# Patient Record
Sex: MALE | Race: WHITE | NOT HISPANIC OR LATINO | URBAN - METROPOLITAN AREA
[De-identification: names, ages, dates, MRNs, and addresses within clinical notes are randomized per-mention and may not be internally consistent; named-entity substitution may affect disease eponyms.]

---

## 2024-07-12 ENCOUNTER — PREP FOR PROCEDURE (OUTPATIENT)
Dept: UROLOGY | Facility: CLINIC | Age: 54
End: 2024-07-12

## 2024-07-19 NOTE — PROGRESS NOTES
HPI  PT PRESENTS FOR A F/U ON HIS H/O BLADDER CANCER.     PT HAS BEEN ON INTRAVESICAL MITOMYCIN THERAPY -- LAST TREATMENT WAS END OF MAY.  OVERALL HE IS DOING WELL.  HE IS DUE FOR A SURVEILLANCE CYSTO.   Are you experiencing:  Burning on urination --  NO  Urinary frequency  --NBO  Urinary urgency --NO  Urge incontinence -- NO  Nocturia-- ONCE  Hematuria -- NO  Hesitancy -- NO  Post void fullness --   NO    Review of Systems  General-- No C/O fever or chills  Head-- No C/O Dizziness  Eyes-- NO  C/O blurry or double vision  Ears-- No C/O hearing loss  Neck-- Supple -- OCC NECK PAIN  Chest-- No C/O pain or discomfort  Lungs-- No C/O shortness of breath  Abdomen-- No C/O  pain or discomfort, No nausea or vomiting  Back-- No C/O back pain or discomfort  Extremities-- No C/O swelling or pain      Objective   Physical Exam    General-- well developed, well nourished in NAD  Head-- normal cephalic, atraumatic  Eyes-- PERRL, EOM'S FROM,  no  jaundice  Neck-- Supple, without masses  Chest-- Normal bony structure  Abdomen-- soft, non tender, liver spleen not tender. No supra pubic masses, OBESE, RECTUS DIASTASES   Back-- no flank masses palpable, no CVA tenderness on palpation or perc;ussion  Lymph nodes-- No inguinal lymphadenopathy noted  Prostate-- 1+, firm, smooth, non tender,without nodules  Testis-- both down, non tender, without masses  Epididymis-- no masses palpable  Scrotum -- no hydrocele noted  Extremities -- Normal muscle mass and tone for the patients age  Neurological-- oriented times three    2-23-24  CAT SCAN OF THE ABD AND PELVIS:  Impression  Mild dilation of the distal right ureter with compared to the left is nonspecific and may be due to peristalsis however lesion at the right ureterovesicular junction is not excluded.    Wall thickening of the urinary bladder may be due to incomplete distention or cystitis.    No urinary tract calculi identified.    Assessment/Plan   H/O BLADDER CANCER-- PT IS ON  INTRAVESICAL MITOMYCIN THERA;Y-- PT HAS HAD INDUCTION AND TWO MONTHLY TREATMENTS SINCE HIS LAST CYSTO AND BLADDER BX   PT S/P INTRAVESICAL BCG THERAPY  PRIOR TO THE MITOMYCIN  P:  SCHEDULE:  CYSTO, OUT PT,GEN ANES  OBTAIN ALL RECORDS FROM [facility]     Ceferino Lynch MD

## 2024-07-22 ENCOUNTER — LAB (OUTPATIENT)
Dept: LAB | Facility: LAB | Age: 54
End: 2024-07-22
Payer: COMMERCIAL

## 2024-07-22 ENCOUNTER — OFFICE VISIT (OUTPATIENT)
Dept: UROLOGY | Facility: CLINIC | Age: 54
End: 2024-07-22
Payer: COMMERCIAL

## 2024-07-22 VITALS
DIASTOLIC BLOOD PRESSURE: 82 MMHG | HEART RATE: 98 BPM | BODY MASS INDEX: 35.87 KG/M2 | TEMPERATURE: 98 F | SYSTOLIC BLOOD PRESSURE: 163 MMHG | WEIGHT: 250 LBS

## 2024-07-22 DIAGNOSIS — C67.9 MALIGNANT NEOPLASM OF URINARY BLADDER, UNSPECIFIED SITE (MULTI): ICD-10-CM

## 2024-07-22 LAB
ALBUMIN SERPL BCP-MCNC: 4.3 G/DL (ref 3.4–5)
ALP SERPL-CCNC: 27 U/L (ref 33–120)
ALT SERPL W P-5'-P-CCNC: 27 U/L (ref 10–52)
ANION GAP SERPL CALC-SCNC: 10 MMOL/L (ref 10–20)
AST SERPL W P-5'-P-CCNC: 18 U/L (ref 9–39)
BILIRUB SERPL-MCNC: 0.6 MG/DL (ref 0–1.2)
BUN SERPL-MCNC: 13 MG/DL (ref 6–23)
CALCIUM SERPL-MCNC: 9 MG/DL (ref 8.6–10.3)
CHLORIDE SERPL-SCNC: 106 MMOL/L (ref 98–107)
CO2 SERPL-SCNC: 27 MMOL/L (ref 21–32)
CREAT SERPL-MCNC: 0.91 MG/DL (ref 0.5–1.3)
EGFRCR SERPLBLD CKD-EPI 2021: >90 ML/MIN/1.73M*2
ERYTHROCYTE [DISTWIDTH] IN BLOOD BY AUTOMATED COUNT: 12.2 % (ref 11.5–14.5)
GLUCOSE SERPL-MCNC: 106 MG/DL (ref 74–99)
HCT VFR BLD AUTO: 40.7 % (ref 41–52)
HGB BLD-MCNC: 14.4 G/DL (ref 13.5–17.5)
MCH RBC QN AUTO: 32.7 PG (ref 26–34)
MCHC RBC AUTO-ENTMCNC: 35.4 G/DL (ref 32–36)
MCV RBC AUTO: 92 FL (ref 80–100)
NRBC BLD-RTO: 0 /100 WBCS (ref 0–0)
PLATELET # BLD AUTO: 204 X10*3/UL (ref 150–450)
POTASSIUM SERPL-SCNC: 4.3 MMOL/L (ref 3.5–5.3)
PROT SERPL-MCNC: 6.3 G/DL (ref 6.4–8.2)
RBC # BLD AUTO: 4.41 X10*6/UL (ref 4.5–5.9)
SODIUM SERPL-SCNC: 139 MMOL/L (ref 136–145)
WBC # BLD AUTO: 6.8 X10*3/UL (ref 4.4–11.3)

## 2024-07-22 PROCEDURE — 36415 COLL VENOUS BLD VENIPUNCTURE: CPT

## 2024-07-22 PROCEDURE — 87086 URINE CULTURE/COLONY COUNT: CPT | Mod: PARLAB | Performed by: UROLOGY

## 2024-07-22 PROCEDURE — 99214 OFFICE O/P EST MOD 30 MIN: CPT | Performed by: UROLOGY

## 2024-07-22 PROCEDURE — 80053 COMPREHEN METABOLIC PANEL: CPT

## 2024-07-22 PROCEDURE — 85027 COMPLETE CBC AUTOMATED: CPT

## 2024-07-22 RX ORDER — NAPROXEN 500 MG/1
500 TABLET ORAL DAILY PRN
COMMUNITY

## 2024-07-22 RX ORDER — CRANBERRY FRUIT EXTRACT 650 MG
2 CAPSULE ORAL DAILY
COMMUNITY

## 2024-07-23 ENCOUNTER — PREP FOR PROCEDURE (OUTPATIENT)
Dept: UROLOGY | Facility: CLINIC | Age: 54
End: 2024-07-23
Payer: COMMERCIAL

## 2024-07-23 DIAGNOSIS — C67.2 MALIGNANT NEOPLASM OF LATERAL WALL OF URINARY BLADDER (MULTI): Primary | ICD-10-CM

## 2024-07-23 LAB — BACTERIA UR CULT: NORMAL

## 2024-07-23 RX ORDER — CEFAZOLIN SODIUM 2 G/100ML
2 INJECTION, SOLUTION INTRAVENOUS ONCE
OUTPATIENT
Start: 2024-07-23 | End: 2024-07-23

## 2024-07-23 RX ORDER — DEXTROSE MONOHYDRATE AND SODIUM CHLORIDE 5; .45 G/100ML; G/100ML
100 INJECTION, SOLUTION INTRAVENOUS CONTINUOUS
Status: CANCELLED | OUTPATIENT
Start: 2024-07-23

## 2024-07-26 ENCOUNTER — PRE-ADMISSION TESTING (OUTPATIENT)
Dept: PREADMISSION TESTING | Facility: HOSPITAL | Age: 54
End: 2024-07-26
Payer: COMMERCIAL

## 2024-07-26 VITALS
RESPIRATION RATE: 20 BRPM | HEIGHT: 70 IN | TEMPERATURE: 96.8 F | WEIGHT: 248.46 LBS | DIASTOLIC BLOOD PRESSURE: 105 MMHG | HEART RATE: 61 BPM | BODY MASS INDEX: 35.57 KG/M2 | OXYGEN SATURATION: 99 % | SYSTOLIC BLOOD PRESSURE: 188 MMHG

## 2024-07-26 DIAGNOSIS — Z01.818 PREOP TESTING: Primary | ICD-10-CM

## 2024-07-26 PROCEDURE — 93005 ELECTROCARDIOGRAM TRACING: CPT

## 2024-07-26 PROCEDURE — 99204 OFFICE O/P NEW MOD 45 MIN: CPT

## 2024-07-26 PROCEDURE — 93010 ELECTROCARDIOGRAM REPORT: CPT | Performed by: STUDENT IN AN ORGANIZED HEALTH CARE EDUCATION/TRAINING PROGRAM

## 2024-07-26 RX ORDER — ACETAMINOPHEN 325 MG/1
TABLET ORAL EVERY 6 HOURS PRN
COMMUNITY

## 2024-07-26 ASSESSMENT — PAIN - FUNCTIONAL ASSESSMENT: PAIN_FUNCTIONAL_ASSESSMENT: 0-10

## 2024-07-26 ASSESSMENT — DUKE ACTIVITY SCORE INDEX (DASI)
CAN YOU PARTICIPATE IN STRENOUS SPORTS LIKE SWIMMING, SINGLES TENNIS, FOOTBALL, BASKETBALL, OR SKIING: NO
CAN YOU RUN A SHORT DISTANCE: NO
DASI METS SCORE: 8
CAN YOU TAKE CARE OF YOURSELF (EAT, DRESS, BATHE, OR USE TOILET): YES
CAN YOU PARTICIPATE IN MODERATE RECREATIONAL ACTIVITIES LIKE GOLF, BOWLING, DANCING, DOUBLES TENNIS OR THROWING A BASEBALL OR FOOTBALL: YES
CAN YOU DO YARD WORK LIKE RAKING LEAVES, WEEDING OR PUSHING A MOWER: YES
CAN YOU DO LIGHT WORK AROUND THE HOUSE LIKE DUSTING OR WASHING DISHES: YES
CAN YOU HAVE SEXUAL RELATIONS: YES
CAN YOU DO MODERATE WORK AROUND THE HOUSE LIKE VACUUMING, SWEEPING FLOORS OR CARRYING GROCERIES: YES
CAN YOU DO HEAVY WORK AROUND THE HOUSE LIKE SCRUBBING FLOORS OR LIFTING AND MOVING HEAVY FURNITURE: YES
CAN YOU WALK A BLOCK OR TWO ON LEVEL GROUND: YES
CAN YOU CLIMB A FLIGHT OF STAIRS OR WALK UP A HILL: YES
TOTAL_SCORE: 42.7
CAN YOU WALK INDOORS, SUCH AS AROUND YOUR HOUSE: YES

## 2024-07-26 ASSESSMENT — PAIN SCALES - GENERAL: PAINLEVEL_OUTOF10: 0 - NO PAIN

## 2024-07-26 NOTE — CPM/PAT H&P
CPM/PAT Evaluation       Name: Cornell Larios (Cornell Larios)  /Age: 1970/54 y.o.     Visit Type:   In-Person       Chief Complaint: bladder cancer    HPI  Patient is a 54 year old male with a history of former tobacco use and bladder cancer who presents today for preoperative evaluation for cystoscopy. He has no other documented significant history. Patient follows with  urology for his bladder cancer. He has been on intravesical mitomycin therapy and he is due for his surveillance cystoscopy. Patient is scheduled for this on 24 with Dr. Lynch. Patient denies recent illness, fever, chills, cough, shortness of breath, chest pain, palpitations, lower extremity edema, GI symptoms, or urinary symptoms such as hematuria, frequency or burning with urination. He does endorse chronic bilateral leg and low back pain that he takes Naproxen for daily.     Of note: patient's BP is elevated on exam today at 188/105. He has no documented history of hypertension. He had a cardiac stress test in 2023 which was normal. At that time Dr. Briggs placed him on a weeks worth of some BP med as he was slightly elevated for his procedure in . Patient states that he has not been on anything continuous for BP since. Usually when he checks his pressure at home he's in the 140s/90s.    History reviewed. No pertinent past medical history.    Past Surgical History:   Procedure Laterality Date    OTHER SURGICAL HISTORY      Bladder cancer removal       Patient  has no history on file for sexual activity.    No family history on file.    No Known Allergies    Prior to Admission medications    Medication Sig Start Date End Date Taking? Authorizing Provider   multivit-min-FA-lycopene-boron (Bladder 2.2) 200-5-250 mcg-mg-mcg tablet Take 2 tablets by mouth once daily.    Historical Provider, MD   naproxen (EC Naprosyn) 500 mg EC tablet Take 1 tablet (500 mg) by mouth once daily as needed for mild pain (1 - 3). Do not  crush, chew, or split.    Historical Provider, MD        A ten-point review of systems was completed and is otherwise negative except for what is mentioned in the HPI above.    Physical Exam:    GENERAL: Well developed, awake/alert/oriented x3, no distress, alert and cooperative  HEENT: MMM  NECK: Trachea midline, no lymphadenopathy  CARDIOVASCULAR: RRR, mildly bradycardic in the 50s, normal S1 and S 2, no murmurs, 2+ equal pulses of the extremities, no LE edema  RESPIRATORY: Patent airways, CTAB, normal breath sounds with good chest expansion, thorax symmetric  ABDOMEN: Soft, non-tender, no distention  SKIN: Warm and dry  EXTREMITIES: No cyanosis, edema  NEURO: A&O x 3, normal motor and sensation, no focal deficits   PSYCH: Appropriate mood and behavior      PAT AIRWAY:   Airway:     Mallampati::  I    TM distance::  >3 FB    Neck ROM::  Full  normal        Visit Vitals  BP (!) 180/100   Pulse 61   Temp 36 °C (96.8 °F) (Temporal)   Resp 20       DASI Risk Score      Flowsheet Row Most Recent Value   DASI SCORE 42.7   METS Score (Will be calculated only when all the questions are answered) 8          Caprini DVT Assessment    No data to display       Modified Frailty Index    No data to display       CHADS2 Stroke Risk  Current as of 11 minutes ago        N/A 3 to 100%: High Risk   2 to < 3%: Medium Risk   0 to < 2%: Low Risk     Last Change: N/A          This score determines the patient's risk of having a stroke if the patient has atrial fibrillation.        This score is not applicable to this patient. Components are not calculated.          Revised Cardiac Risk Index    No data to display       Apfel Simplified Score    No data to display       Risk Analysis Index Results This Encounter    No data found in the last 1 encounters.       Stop Bang Score      Flowsheet Row Most Recent Value   Do you snore loudly? 0   Do you often feel tired or fatigued after your sleep? 0   Has anyone ever observed you stop  breathing in your sleep? 0   Do you have or are you being treated for high blood pressure? 0   Recent BMI (Calculated) 35.7   Is BMI greater than 35 kg/m2? 1=Yes   Age older than 50 years old? 1=Yes   Is your neck circumference greater than 17 inches (Male) or 16 inches (Female)? 1   Gender - Male 1=Yes   STOP-BANG Total Score 4            Assessment and Plan:   Patient is a 54 year old male with a history of former tobacco use and bladder cancer He has been on intravesical mitomycin therapy.    #Bladder cancer  Scheduled for cystoscopy on 7/30/24 with Dr. Lynch.    #High blood pressure with undocumented HTN  Patient had remote history of HTN which he currently takes nothing for.   Had a normal stress test in June of 2023.   EKG obtained today demonstrates sinus bradycardia with no other abnormalities.  BP is R 189/100  -- L 186/101 > 188/105 on recheck. Patient is asymptomatic.  Message sent to anesthesia Dr. Covarrubias and Dr. Briggs in attempts to facilitate expedient treatment of hypertension prior to Tuesday's procedure. Unfortunately we were unable to reach Dr. Briggs at this time to see if he could treat the patient by Tuesday. Patient also states it will be difficult to get up here for an appointment with Dr. Briggs since he lives in [place]. Patient does not have a PCP currently so I advised him to seek medical treatment for his hypertension at an urgent care over the weekend in hopes that he can get his blood pressure under control prior to Tuesday. Patient was advised to monitor his BP at home over the weekend after getting on an antihypertensive and to call Dr. Lynch's office if he is unable to maintain a BP <170/90. In this case anesthesia would likely postpone procedure. Dr. Lynch was messaged and notified of today's findings and plan moving forward.     Patient was also strongly advised to find  PCP near him to follow up with his BP, elevated blood glucose, and daily use of Naproxen for leg pain.      Labs from 7/22/24 reviewed and generally unremarkable. Glucose is 106. Encouraged patient to find a new PCP to follow with regarding elevated blood glucose.   Urine culture shows no growth.     I spent 45 minutes in the professional and overall care of this patient. Greater than 50% of this time was spent counseling patient, reviewing plan of care.    Dorita Prajapati, APRN-CNP

## 2024-07-29 ENCOUNTER — APPOINTMENT (OUTPATIENT)
Dept: UROLOGY | Facility: CLINIC | Age: 54
End: 2024-07-29

## 2024-07-30 ENCOUNTER — ANESTHESIA EVENT (OUTPATIENT)
Dept: OPERATING ROOM | Facility: HOSPITAL | Age: 54
End: 2024-07-30
Payer: COMMERCIAL

## 2024-07-30 ENCOUNTER — ANESTHESIA (OUTPATIENT)
Dept: OPERATING ROOM | Facility: HOSPITAL | Age: 54
End: 2024-07-30
Payer: COMMERCIAL

## 2024-07-30 ENCOUNTER — HOSPITAL ENCOUNTER (OUTPATIENT)
Facility: HOSPITAL | Age: 54
Setting detail: OUTPATIENT SURGERY
Discharge: HOME | End: 2024-07-30
Attending: UROLOGY | Admitting: UROLOGY
Payer: COMMERCIAL

## 2024-07-30 VITALS
HEART RATE: 51 BPM | SYSTOLIC BLOOD PRESSURE: 135 MMHG | DIASTOLIC BLOOD PRESSURE: 80 MMHG | OXYGEN SATURATION: 95 % | RESPIRATION RATE: 16 BRPM | TEMPERATURE: 96.8 F

## 2024-07-30 DIAGNOSIS — C67.2 MALIGNANT NEOPLASM OF LATERAL WALL OF URINARY BLADDER (MULTI): ICD-10-CM

## 2024-07-30 PROCEDURE — 2500000004 HC RX 250 GENERAL PHARMACY W/ HCPCS (ALT 636 FOR OP/ED)

## 2024-07-30 PROCEDURE — 3600000008 HC OR TIME - EACH INCREMENTAL 1 MINUTE - PROCEDURE LEVEL THREE: Performed by: UROLOGY

## 2024-07-30 PROCEDURE — 7100000009 HC PHASE TWO TIME - INITIAL BASE CHARGE: Performed by: UROLOGY

## 2024-07-30 PROCEDURE — 2500000005 HC RX 250 GENERAL PHARMACY W/O HCPCS: Performed by: UROLOGY

## 2024-07-30 PROCEDURE — 7100000002 HC RECOVERY ROOM TIME - EACH INCREMENTAL 1 MINUTE: Performed by: UROLOGY

## 2024-07-30 PROCEDURE — 2500000005 HC RX 250 GENERAL PHARMACY W/O HCPCS

## 2024-07-30 PROCEDURE — 3700000002 HC GENERAL ANESTHESIA TIME - EACH INCREMENTAL 1 MINUTE: Performed by: UROLOGY

## 2024-07-30 PROCEDURE — A52000 PR CYSTOURETHROSCOPY: Performed by: NURSE ANESTHETIST, CERTIFIED REGISTERED

## 2024-07-30 PROCEDURE — 7100000010 HC PHASE TWO TIME - EACH INCREMENTAL 1 MINUTE: Performed by: UROLOGY

## 2024-07-30 PROCEDURE — 2500000004 HC RX 250 GENERAL PHARMACY W/ HCPCS (ALT 636 FOR OP/ED): Mod: JZ | Performed by: UROLOGY

## 2024-07-30 PROCEDURE — 88112 CYTOPATH CELL ENHANCE TECH: CPT | Performed by: PATHOLOGY

## 2024-07-30 PROCEDURE — 7100000001 HC RECOVERY ROOM TIME - INITIAL BASE CHARGE: Performed by: UROLOGY

## 2024-07-30 PROCEDURE — 88112 CYTOPATH CELL ENHANCE TECH: CPT | Mod: TC | Performed by: UROLOGY

## 2024-07-30 PROCEDURE — 3700000001 HC GENERAL ANESTHESIA TIME - INITIAL BASE CHARGE: Performed by: UROLOGY

## 2024-07-30 PROCEDURE — A52000 PR CYSTOURETHROSCOPY: Performed by: ANESTHESIOLOGY

## 2024-07-30 PROCEDURE — 3600000003 HC OR TIME - INITIAL BASE CHARGE - PROCEDURE LEVEL THREE: Performed by: UROLOGY

## 2024-07-30 RX ORDER — ACETAMINOPHEN 325 MG/1
975 TABLET ORAL ONCE
Status: DISCONTINUED | OUTPATIENT
Start: 2024-07-30 | End: 2024-07-30 | Stop reason: HOSPADM

## 2024-07-30 RX ORDER — CEFAZOLIN SODIUM 2 G/100ML
2 INJECTION, SOLUTION INTRAVENOUS ONCE
Status: COMPLETED | OUTPATIENT
Start: 2024-07-30 | End: 2024-07-30

## 2024-07-30 RX ORDER — SODIUM CHLORIDE, SODIUM LACTATE, POTASSIUM CHLORIDE, CALCIUM CHLORIDE 600; 310; 30; 20 MG/100ML; MG/100ML; MG/100ML; MG/100ML
100 INJECTION, SOLUTION INTRAVENOUS CONTINUOUS
Status: DISCONTINUED | OUTPATIENT
Start: 2024-07-30 | End: 2024-07-30 | Stop reason: HOSPADM

## 2024-07-30 RX ORDER — MEPERIDINE HYDROCHLORIDE 25 MG/ML
12.5 INJECTION INTRAMUSCULAR; INTRAVENOUS; SUBCUTANEOUS EVERY 10 MIN PRN
Status: DISCONTINUED | OUTPATIENT
Start: 2024-07-30 | End: 2024-07-30 | Stop reason: HOSPADM

## 2024-07-30 RX ORDER — LABETALOL HYDROCHLORIDE 5 MG/ML
10 INJECTION, SOLUTION INTRAVENOUS ONCE AS NEEDED
Status: DISCONTINUED | OUTPATIENT
Start: 2024-07-30 | End: 2024-07-30 | Stop reason: HOSPADM

## 2024-07-30 RX ORDER — LOSARTAN POTASSIUM 50 MG/1
100 TABLET ORAL DAILY
COMMUNITY

## 2024-07-30 RX ORDER — LIDOCAINE HCL/PF 100 MG/5ML
SYRINGE (ML) INTRAVENOUS AS NEEDED
Status: DISCONTINUED | OUTPATIENT
Start: 2024-07-30 | End: 2024-07-30

## 2024-07-30 RX ORDER — ALBUTEROL SULFATE 0.83 MG/ML
2.5 SOLUTION RESPIRATORY (INHALATION) ONCE AS NEEDED
Status: DISCONTINUED | OUTPATIENT
Start: 2024-07-30 | End: 2024-07-30 | Stop reason: HOSPADM

## 2024-07-30 RX ORDER — ONDANSETRON HYDROCHLORIDE 2 MG/ML
INJECTION, SOLUTION INTRAVENOUS AS NEEDED
Status: DISCONTINUED | OUTPATIENT
Start: 2024-07-30 | End: 2024-07-30

## 2024-07-30 RX ORDER — HYDRALAZINE HYDROCHLORIDE 20 MG/ML
10 INJECTION INTRAMUSCULAR; INTRAVENOUS EVERY 30 MIN PRN
Status: DISCONTINUED | OUTPATIENT
Start: 2024-07-30 | End: 2024-07-30 | Stop reason: HOSPADM

## 2024-07-30 RX ORDER — HYDROMORPHONE HYDROCHLORIDE 1 MG/ML
1 INJECTION, SOLUTION INTRAMUSCULAR; INTRAVENOUS; SUBCUTANEOUS EVERY 5 MIN PRN
Status: DISCONTINUED | OUTPATIENT
Start: 2024-07-30 | End: 2024-07-30 | Stop reason: HOSPADM

## 2024-07-30 RX ORDER — SCOLOPAMINE TRANSDERMAL SYSTEM 1 MG/1
1 PATCH, EXTENDED RELEASE TRANSDERMAL ONCE
Status: DISCONTINUED | OUTPATIENT
Start: 2024-07-30 | End: 2024-07-30 | Stop reason: HOSPADM

## 2024-07-30 RX ORDER — MIDAZOLAM HYDROCHLORIDE 1 MG/ML
INJECTION, SOLUTION INTRAMUSCULAR; INTRAVENOUS AS NEEDED
Status: DISCONTINUED | OUTPATIENT
Start: 2024-07-30 | End: 2024-07-30

## 2024-07-30 RX ORDER — FAMOTIDINE 10 MG/ML
20 INJECTION INTRAVENOUS ONCE
Status: DISCONTINUED | OUTPATIENT
Start: 2024-07-30 | End: 2024-07-30 | Stop reason: HOSPADM

## 2024-07-30 RX ORDER — MORPHINE SULFATE 2 MG/ML
2 INJECTION, SOLUTION INTRAMUSCULAR; INTRAVENOUS EVERY 5 MIN PRN
Status: DISCONTINUED | OUTPATIENT
Start: 2024-07-30 | End: 2024-07-30 | Stop reason: HOSPADM

## 2024-07-30 RX ORDER — SODIUM CHLORIDE, SODIUM LACTATE, POTASSIUM CHLORIDE, CALCIUM CHLORIDE 600; 310; 30; 20 MG/100ML; MG/100ML; MG/100ML; MG/100ML
INJECTION, SOLUTION INTRAVENOUS CONTINUOUS PRN
Status: DISCONTINUED | OUTPATIENT
Start: 2024-07-30 | End: 2024-07-30

## 2024-07-30 RX ORDER — FENTANYL CITRATE 50 UG/ML
INJECTION, SOLUTION INTRAMUSCULAR; INTRAVENOUS AS NEEDED
Status: DISCONTINUED | OUTPATIENT
Start: 2024-07-30 | End: 2024-07-30

## 2024-07-30 RX ORDER — PROPOFOL 10 MG/ML
INJECTION, EMULSION INTRAVENOUS AS NEEDED
Status: DISCONTINUED | OUTPATIENT
Start: 2024-07-30 | End: 2024-07-30

## 2024-07-30 RX ORDER — ONDANSETRON HYDROCHLORIDE 2 MG/ML
4 INJECTION, SOLUTION INTRAVENOUS ONCE AS NEEDED
Status: DISCONTINUED | OUTPATIENT
Start: 2024-07-30 | End: 2024-07-30 | Stop reason: HOSPADM

## 2024-07-30 RX ORDER — SODIUM CHLORIDE 0.9 G/100ML
IRRIGANT IRRIGATION AS NEEDED
Status: DISCONTINUED | OUTPATIENT
Start: 2024-07-30 | End: 2024-07-30 | Stop reason: HOSPADM

## 2024-07-30 RX ORDER — MIDAZOLAM HYDROCHLORIDE 1 MG/ML
1 INJECTION, SOLUTION INTRAMUSCULAR; INTRAVENOUS ONCE AS NEEDED
Status: DISCONTINUED | OUTPATIENT
Start: 2024-07-30 | End: 2024-07-30 | Stop reason: HOSPADM

## 2024-07-30 RX ORDER — DIPHENHYDRAMINE HYDROCHLORIDE 50 MG/ML
25 INJECTION INTRAMUSCULAR; INTRAVENOUS ONCE AS NEEDED
Status: DISCONTINUED | OUTPATIENT
Start: 2024-07-30 | End: 2024-07-30 | Stop reason: HOSPADM

## 2024-07-30 RX ORDER — METOPROLOL TARTRATE 1 MG/ML
5 INJECTION, SOLUTION INTRAVENOUS ONCE
Status: DISCONTINUED | OUTPATIENT
Start: 2024-07-30 | End: 2024-07-30 | Stop reason: HOSPADM

## 2024-07-30 SDOH — HEALTH STABILITY: MENTAL HEALTH: CURRENT SMOKER: 0

## 2024-07-30 ASSESSMENT — PAIN SCALES - GENERAL
PAINLEVEL_OUTOF10: 0 - NO PAIN

## 2024-07-30 ASSESSMENT — COLUMBIA-SUICIDE SEVERITY RATING SCALE - C-SSRS
1. IN THE PAST MONTH, HAVE YOU WISHED YOU WERE DEAD OR WISHED YOU COULD GO TO SLEEP AND NOT WAKE UP?: NO
6. HAVE YOU EVER DONE ANYTHING, STARTED TO DO ANYTHING, OR PREPARED TO DO ANYTHING TO END YOUR LIFE?: NO
2. HAVE YOU ACTUALLY HAD ANY THOUGHTS OF KILLING YOURSELF?: NO

## 2024-07-30 ASSESSMENT — PAIN - FUNCTIONAL ASSESSMENT
PAIN_FUNCTIONAL_ASSESSMENT: 0-10
PAIN_FUNCTIONAL_ASSESSMENT: 0-10

## 2024-07-30 NOTE — OP NOTE
CYSTOSCOPY/URINE TESTING Operative Note     Date: 2024  OR Location: [facility]    Name: Cornell Larios, : 1970, Age: 54 y.o., MRN: [id number], Sex: male    Diagnosis  Pre-op Diagnosis      * Malignant neoplasm of lateral wall of urinary bladder (Multi) [C67.2] Post-op Diagnosis     * Malignant neoplasm of lateral wall of urinary bladder (Multi) [C67.2]     Procedures  CYSTOSCOPY/URINE TESTING  64077 - AR CYSTOURETHROSCOPY    CYSTOSCOPY/URINE TESTING  55707 - AR CYSTOURETHROSCOPY      Surgeons      * Ceferino Lynch - Primary    Resident/Fellow/Other Assistant:  Surgeons and Role:  * No surgeons found with a matching role *    Procedure Summary  This is a 54-year-old male who has been seen by me in the past because of a history of bladder cancer.  The patient has been on intravesical mitomycin in the past and now comes into the operating room to undergo a surveillance cystoscopy.  Operative note the patient was brought into the operating room placed on the operating table in the supine position and after adequate general anesthesia was induced was placed in dorsal lithotomy position.  After sterilely prepping and draping the patient's perineum and genital area and #22 [place] Storz cystoscope was introduced into the urethra and under direct vision passed into the bladder.  Urine from the bladder was obtained and sent to pathology for a FISH test.  The bladder was then carefully inspected and no tumors, stones, foreign bodies or diverticuli were identified.  The ureteral orifice ease were in the normal anatomic position and refluxing clear urine bilaterally.  There was mild to moderate prostatic hypertrophy present.  A previous biopsy site on the left posterior wall of the bladder had healed completely and there was no evidence of residual tumor present.  Assessment normal cystoscopy.  Plan the patient will be seen by me in approximately 3 months for a surveillance cystoscopy.  Anesthesia: General  ASA:  III  Anesthesia Staff: Anesthesiologist: Donnie Covarrubias MD  CRNA: IRA Aldridge, RIC  SRNA: Xiomara Shane  Estimated Blood Loss: 0mL  Intra-op Medications: Administrations occurring from 1305 to 1405 on 07/30/24:  * No intraprocedure medications in log *           Anesthesia Record               Intraprocedure I/O Totals       None           Specimen:   ID Type Source Tests Collected by Time   1 : URINE FOR CYTOLOGY-SEE COMMENT-FROM CYTOSCOPY IN OR Non-Gynecologic Cytology URINE CLEAN CATCH CYTOLOGY CONSULTATION (NON-GYNECOLOGIC) Ceferino Lynch MD 7/30/2024 1303        Staff:   Circulator: Dee Marte Person: Marcia         Drains and/or Catheters: * None in log *    Tourniquet Times:         Implants:     Findings: see op note    Indications: Cornell Larios is an 54 y.o. male who is having surgery for Malignant neoplasm of lateral wall of urinary bladder (Multi) [C67.2]. Pt has been on intravesicql mitomycin in the past and now presents for a surveillance cysto    The patient was seen in the preoperative area. The risks, benefits, complications, treatment options, non-operative alternatives, expected recovery and outcomes were discussed with the patient. The possibilities of reaction to medication, pulmonary aspiration, injury to surrounding structures, bleeding, recurrent infection, the need for additional procedures, failure to diagnose a condition, and creating a complication requiring transfusion or operation were discussed with the patient. The patient concurred with the proposed plan, giving informed consent.  The site of surgery was properly noted/marked if necessary per policy. The patient has been actively warmed in preoperative area. Preoperative antibiotics have been ordered and given within 1 hours of incision. Venous thrombosis prophylaxis     Procedure Details: see op note above  Complications:  None; patient tolerated the procedure well.    Disposition: PACU - hemodynamically  stable.  Condition: stable         Additional Details: none    Attending Attestation:     Ceferino Lynch  Phone Number: [phone]

## 2024-07-30 NOTE — ANESTHESIA POSTPROCEDURE EVALUATION
Patient: Cornell Larios    Procedure Summary       Date: 07/30/24 Room / Location: [facility] OR 03 / Virtual [facility] OR    Anesthesia Start: 1250 Anesthesia Stop: 1313    Procedure: CYSTOSCOPY/URINE TESTING Diagnosis:       Malignant neoplasm of lateral wall of urinary bladder (Multi)      (Malignant neoplasm of lateral wall of urinary bladder (Multi) [C67.2])    Surgeons: Ceferino Lynch MD Responsible Provider: Donnie Covarrubias MD    Anesthesia Type: general ASA Status: 3            Anesthesia Type: general    Vitals Value Taken Time   /70 07/30/24 1345   Temp 36 °C (96.8 °F) 07/30/24 1312   Pulse 50 07/30/24 1349   Resp 16 07/30/24 1345   SpO2 95 % 07/30/24 1349   Vitals shown include unfiled device data.    Anesthesia Post Evaluation    Patient location during evaluation: PACU  Patient participation: complete - patient participated  Level of consciousness: awake and alert  Pain management: satisfactory to patient  Airway patency: patent  Cardiovascular status: acceptable  Respiratory status: acceptable  Hydration status: acceptable  Postoperative Nausea and Vomiting: none        No notable events documented.

## 2024-07-30 NOTE — ANESTHESIA PROCEDURE NOTES
Airway  Date/Time: 7/30/2024 12:56 PM  Urgency: elective    Airway not difficult    Staffing  Performed: SRNA   Authorized by: Donnie Covarrubias MD    Performed by: Xiomara Shane  Patient location during procedure: OR    Indications and Patient Condition  Indications for airway management: anesthesia  Spontaneous Ventilation: absent  Sedation level: deep  Preoxygenated: yes  Patient position: sniffing  Mask difficulty assessment: 0 - not attempted    Final Airway Details  Final airway type: supraglottic airway      Successful airway: Supraglottic airway: I-gel.  Size 5     Number of attempts at approach: 1    Additional Comments  Teeth and lips in pre-anesthetic condition.

## 2024-07-30 NOTE — ANESTHESIA PREPROCEDURE EVALUATION
Patient: Cornell Larios    Procedure Information       Date/Time: 07/30/24 1305    Procedure: CYSTOSCOPY - SURVEILLANCE CYSTOSCOPY    Location: [facility] OR 03 / [facility]    Surgeons: Ceferino Lynch MD            Relevant Problems   Anesthesia   (-) Difficult intubation   (-) PONV (postoperative nausea and vomiting)       Clinical information reviewed:    Allergies  Meds               NPO Detail:  NPO/Void Status  Date of Last Liquid: 07/30/24  Time of Last Liquid: 0900  Date of Last Solid: 07/29/24  Time of Last Solid: 1800         Physical Exam    Airway  Mallampati: II  TM distance: >3 FB  Neck ROM: full     Cardiovascular - normal exam  Rhythm: regular  Rate: normal     Dental    Pulmonary - normal exam     Abdominal            Anesthesia Plan    History of general anesthesia?: yes  History of complications of general anesthesia?: no    ASA 3     general     The patient is not a current smoker.  Education provided regarding risk of obstructive sleep apnea.  intravenous induction   Postoperative administration of opioids is intended.  Trial extubation is planned.  Anesthetic plan and risks discussed with patient.    Plan discussed with CRNA, CAA and attending.

## 2024-07-31 LAB
ATRIAL RATE: 51 BPM
P AXIS: 32 DEGREES
P OFFSET: 178 MS
P ONSET: 137 MS
PR INTERVAL: 178 MS
Q ONSET: 226 MS
QRS COUNT: 9 BEATS
QRS DURATION: 100 MS
QT INTERVAL: 428 MS
QTC CALCULATION(BAZETT): 394 MS
QTC FREDERICIA: 405 MS
R AXIS: -6 DEGREES
T AXIS: -2 DEGREES
T OFFSET: 440 MS
VENTRICULAR RATE: 51 BPM

## 2024-07-31 ASSESSMENT — PAIN SCALES - GENERAL: PAINLEVEL_OUTOF10: 0 - NO PAIN

## 2024-08-01 LAB
LABORATORY COMMENT REPORT: NORMAL
LABORATORY COMMENT REPORT: NORMAL
PATH REPORT.FINAL DX SPEC: NORMAL
PATH REPORT.GROSS SPEC: NORMAL
PATH REPORT.RELEVANT HX SPEC: NORMAL
PATH REPORT.TOTAL CANCER: NORMAL

## 2024-08-04 NOTE — H&P
History Of Present Illness  Cornell Larios is a 54 y.o. male presenting with with a h/o bladder cancer.  Pt needs a surveillance cysto..     Past Medical History  No past medical history on file.    Surgical History  Past Surgical History:   Procedure Laterality Date    OTHER SURGICAL HISTORY  2023    Bladder cancer removal        Social History  He reports that he quit smoking about 24 years ago. His smoking use included cigarettes. He has never used smokeless tobacco. He reports current alcohol use of about 1.0 standard drink of alcohol per week. He reports that he does not use drugs.    Family History  No family history on file.     Allergies  Patient has no known allergies.    Review of Systems   All other systems reviewed and are negative.       Physical Exam  Constitutional:       Appearance: Normal appearance.   HENT:      Head: Normocephalic.   Eyes:      Pupils: Pupils are equal, round, and reactive to light.   Cardiovascular:      Pulses: Normal pulses.      Heart sounds: Normal heart sounds.   Pulmonary:      Effort: Pulmonary effort is normal.   Abdominal:      General: Abdomen is flat. Bowel sounds are normal.   Genitourinary:     Penis: Normal.       Testes: Normal.   Musculoskeletal:         General: Normal range of motion.      Cervical back: Normal range of motion.   Skin:     General: Skin is warm.   Neurological:      General: No focal deficit present.      Mental Status: He is alert.   Psychiatric:         Mood and Affect: Mood normal.          Last Recorded Vitals  Blood pressure 135/80, pulse 51, temperature 36 °C (96.8 °F), resp. rate 16, SpO2 95%.    Relevant Results             Assessment/Plan   Principal Problem:    Malignant neoplasm of lateral wall of urinary bladder (Multi)  Pt needs a surveillance cysto  P:  Surveillance cysto, gen anes per the pt'srequest  Ceferino Lynch MD

## 2024-08-20 LAB — SCAN RESULT: NORMAL

## 2024-08-21 ENCOUNTER — TELEMEDICINE (OUTPATIENT)
Dept: UROLOGY | Facility: CLINIC | Age: 54
End: 2024-08-21
Payer: COMMERCIAL

## 2024-08-21 DIAGNOSIS — C67.2 MALIGNANT NEOPLASM OF LATERAL WALL OF URINARY BLADDER (MULTI): Primary | ICD-10-CM

## 2024-08-21 PROCEDURE — 99443 PR PHYS/QHP TELEPHONE EVALUATION 21-30 MIN: CPT | Performed by: STUDENT IN AN ORGANIZED HEALTH CARE EDUCATION/TRAINING PROGRAM

## 2024-08-21 NOTE — PROGRESS NOTES
Subjective   Patient ID: Cornell Larios is a 54 y.o. male who presents for discussion of bladder cancer.    This is a patient referred by Dr. Lynch who used [place] [facility] [place] [facility] but now is a part of our practice.  He has just onboarded.  There has been some growing pains with his transition and he he has asked that I help see a few of his patients for guidance and treatment with non-muscle invasive bladder cancer.    Camden is a 54 year old male.  He has a history of NMIBC.  He has failed BCG in the past per reports.  I do not have his pathology reports or dates at the current time.  I did review his records in the media tab which are somewhat hard to interpret.    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 Right distal ureter abnormality on imaging.  URS negative  Imaging date: 2/2024  Last cysto: 7/2024, negative     PMHx: Bladder Cancer, HR NMIBC  PSHx: As above  Social: Former smoker  Family: No family history         Review of Systems  Review of systems negative unless noted above    Objective   Physical Exam  NAD   Nonlabored    Assessment/Plan   Problem List Items Addressed This Visit             ICD-10-CM    Malignant neoplasm of lateral wall of urinary bladder (Multi) - Primary C67.2             BCG refractory - failure to achieve CR within 6 months of initial BCG    BCG resistant - persistent tumor noted 3 months after initial BCG but to lesser degree/stage/grade which clears at 6 months    BCG relapsing - recurrent tumor in follow-up after achieving CR within 6 months of initial BCG    BCG intolerant - recurrent  tumor in follow-up after inadequate initial BCG due to toxicity                   There are numerous considerations when patients recur with high risk non-muscle invasive bladder cancer despite the use of BCG, which is first line therapy.    Path at recurrence  Timing of recurrence  Adequate BCG    For patients who truly have BCG unresponsive disease there are the following options with the associated possible outcomes and considerations:    Radical cystectomy (surgery) - still likely the preferred management option for oncologic control but an aggressive surgical approach with morbidity and QoL considerations.  This should be the preferred option when patients recur with a history of HG pT1 immediately after BCG due to the poor prognosis of that finding.  Intravesical Adstiladrin - this is an FDA approved in-the-bladder therapy that delivers high doses of IFN alpha with the help of a virus vector.  In a single arm trial design in BCG unresponsive disease with carcinoma in-situ, the complete response rates was around 53% at 3 months with roughly 50% (25% in total) maintaining the complete response at longer time periods.  This medication is delivered in the bladder, well tolerated and only given q3 months.  However, it is very costly.  Intravesical N-803 and BCG - this is a recently FDA approved treatment that combined an immunotherapy superagonist with BCG in BCG unresponsive disease.  This treatment needs BCG to be effective for now and shortages of BCG may limit its use.  This is given similar to BCG, 1x week for 6 week induction.  The complete response ws 71% at any time and the 12 month CR was 45%.  This is just approved and very costly as well.  Intravesical gemcitabine-docetaxel - this is used off label by many urologic oncologists.  The data is less robust as it is all retrospective but it is multi-instituional and large patients. This regimen was built out of necessity given lack of other options.  It is  given 1x week for 6 week induction.Its 12 month CR rate is around 60% and it is extremely cheap  Systemic pembrolizumab - this is FDA approved intravenous options given q3 weeks for a year.  It is approved in [place].  The 3 month CR rate is 40% and this decays to 18% at 12 months.  It is expensive and there are significant adverse events in 5-10% of patients which require short or long term steroid use.   Clinical Trials            PLAN:  I do not think he meets BCG unresponsive HR NMIBC based on his history and I would have either given induction BCG x2 or mBCG last summer at time of first failure  I would not have started mitomycin C as induction and maintenance for HR NMIBC IF he did meet BCG unresponsive criteria. I would have offered him the above options which I think offer more efficacy.  I would suggest Blue Light Cystoscopy, and directed vs random bladder bx with prostatic urethral bx for this history of a positive cytology/FISH in Feb 2024.  We went over how CIS is very challenging to see and diagnosis in a bladder post biopsies and post treatments and how Blue Light helps.  I would stop MMC now  I would recommend OR in 3 months for cysto blue light with bladder bx and prostatic urethral bx either directed or not directed  I will communicate this plan to Dr. Lynch and happy to help further with the patient and his care or have him do that.  I do        Agapito Coyne MD MPH 08/21/24 8:14 AM

## 2024-08-21 NOTE — ASSESSMENT & PLAN NOTE
BCG refractory - failure to achieve CR within 6 months of initial BCG    BCG resistant - persistent tumor noted 3 months after initial BCG but to lesser degree/stage/grade which clears at 6 months    BCG relapsing - recurrent tumor in follow-up after achieving CR within 6 months of initial BCG    BCG intolerant - recurrent tumor in follow-up after inadequate initial BCG due to toxicity                   There are numerous considerations when patients recur with high risk non-muscle invasive bladder cancer despite the use of BCG, which is first line therapy.    Path at recurrence  Timing of recurrence  Adequate BCG    For patients who truly have BCG unresponsive disease there are the following options with the associated possible outcomes and considerations:    Radical cystectomy (surgery) - still likely the preferred management option for oncologic control but an aggressive surgical approach with morbidity and QoL considerations.  This should be the preferred option when patients recur with a history of HG pT1 immediately after BCG due to the poor prognosis of that finding.  Intravesical Adstiladrin - this is an FDA approved in-the-bladder therapy that delivers high doses of IFN alpha with the help of a virus vector.  In a single arm trial design in BCG unresponsive disease with carcinoma in-situ, the complete response rates was around 53% at 3 months with roughly 50% (25% in total) maintaining the complete response at longer time periods.  This medication is delivered in the bladder, well tolerated and only given q3 months.  However, it is very costly.  Intravesical N-803 and BCG - this is a recently FDA approved treatment that combined an immunotherapy superagonist with BCG in BCG unresponsive disease.  This treatment needs BCG to be effective for now and shortages of BCG may limit its use.  This is given similar to BCG, 1x week for 6 week induction.  The complete response ws 71% at any time and the 12  month CR was 45%.  This is just approved and very costly as well.  Intravesical gemcitabine-docetaxel - this is used off label by many urologic oncologists.  The data is less robust as it is all retrospective but it is multi-instituional and large patients. This regimen was built out of necessity given lack of other options.  It is given 1x week for 6 week induction.Its 12 month CR rate is around 60% and it is extremely cheap  Systemic pembrolizumab - this is FDA approved intravenous options given q3 weeks for a year.  It is approved in [place].  The 3 month CR rate is 40% and this decays to 18% at 12 months.  It is expensive and there are significant adverse events in 5-10% of patients which require short or long term steroid use.   Clinical Trials

## 2024-10-03 ENCOUNTER — PREP FOR PROCEDURE (OUTPATIENT)
Dept: UROLOGY | Facility: HOSPITAL | Age: 54
End: 2024-10-03
Payer: COMMERCIAL

## 2024-10-03 DIAGNOSIS — C67.9 MALIGNANT NEOPLASM OF URINARY BLADDER, UNSPECIFIED SITE (MULTI): Primary | ICD-10-CM

## 2024-10-03 RX ORDER — CHLORHEXIDINE GLUCONATE 40 MG/ML
SOLUTION TOPICAL DAILY PRN
OUTPATIENT
Start: 2024-10-03

## 2024-10-03 RX ORDER — SODIUM CHLORIDE, SODIUM LACTATE, POTASSIUM CHLORIDE, CALCIUM CHLORIDE 600; 310; 30; 20 MG/100ML; MG/100ML; MG/100ML; MG/100ML
20 INJECTION, SOLUTION INTRAVENOUS CONTINUOUS
OUTPATIENT
Start: 2024-10-03

## 2024-10-07 ENCOUNTER — PREP FOR PROCEDURE (OUTPATIENT)
Dept: UROLOGY | Facility: CLINIC | Age: 54
End: 2024-10-07
Payer: COMMERCIAL

## 2024-11-04 ENCOUNTER — CLINICAL SUPPORT (OUTPATIENT)
Dept: PREADMISSION TESTING | Facility: HOSPITAL | Age: 54
End: 2024-11-04
Payer: COMMERCIAL

## 2024-11-04 NOTE — CPM/PAT NURSE NOTE
CPM/PAT Nurse Note      Name: Cornell Larios (Cornell Larios)  /Age: 1970/54 y.o.     Cornell Larios is scheduled for Cystoscopy with Ablation Tissue on 24    **Data Input  Past Medical History:   Diagnosis Date    Hypertension        Past Surgical History:   Procedure Laterality Date    OTHER SURGICAL HISTORY      Bladder cancer removal       Patient  has no history on file for sexual activity.    No family history on file.    No Known Allergies    Prior to Admission medications    Medication Sig Start Date End Date Taking? Authorizing Provider   acetaminophen (Tylenol) 325 mg tablet Take by mouth every 6 hours if needed for mild pain (1 - 3).    Historical Provider, MD   losartan (Cozaar) 50 mg tablet Take 2 tablets (100 mg) by mouth once daily.    Historical Provider, MD   multivit-min-FA-lycopene-boron (Bladder 2.2) 200-5-250 mcg-mg-mcg tablet Take 2 tablets by mouth once daily.    Historical Provider, MD   naproxen (EC Naprosyn) 500 mg EC tablet Take 1 tablet (500 mg) by mouth once daily as needed for mild pain (1 - 3). Do not crush, chew, or split.    Historical Provider, MD LAU ROS     DASI Risk Score      Flowsheet Row Pre-Admission Testing from 2024 in [facility]   Can you take care of yourself (eat, dress, bathe, or use toilet)?  2.75 filed at 2024 1204   Can you walk indoors, such as around your house? 1.75 filed at 2024 1204   Can you walk a block or two on level ground?  2.75 filed at 2024 1204   Can you climb a flight of stairs or walk up a hill? 5.5 filed at 2024 1204   Can you run a short distance? 0 filed at 2024 1204   Can you do light work around the house like dusting or washing dishes? 2.7 filed at 2024 1204   Can you do moderate work around the house like vacuuming, sweeping floors or carrying groceries? 3.5 filed at 2024 1204   Can you do heavy work around the house like scrubbing floors or lifting and  moving heavy furniture?  8 filed at 07/26/2024 1204   Can you do yard work like raking leaves, weeding or pushing a mower? 4.5 filed at 07/26/2024 1204   Can you have sexual relations? 5.25 filed at 07/26/2024 1204   Can you participate in moderate recreational activities like golf, bowling, dancing, doubles tennis or throwing a baseball or football? 6 filed at 07/26/2024 1204   Can you participate in strenous sports like swimming, singles tennis, football, basketball, or skiing? 0 filed at 07/26/2024 1204   DASI SCORE 42.7 filed at 07/26/2024 1204   METS Score (Will be calculated only when all the questions are answered) 8 filed at 07/26/2024 1204          Caprini DVT Assessment    No data to display       Modified Frailty Index    No data to display       CHADS2 Stroke Risk  Current as of 6 hours ago        N/A 3 to 100%: High Risk   2 to < 3%: Medium Risk   0 to < 2%: Low Risk     Last Change: N/A          This score determines the patient's risk of having a stroke if the patient has atrial fibrillation.        This score is not applicable to this patient. Components are not calculated.          Revised Cardiac Risk Index    No data to display       Apfel Simplified Score    No data to display       Risk Analysis Index Results This Encounter    No data found in the last 10 encounters.       Stop Bang Score      Flowsheet Row Pre-Admission Testing from 7/26/2024 in [facility]   Do you snore loudly? 0 filed at 07/26/2024 1204   Do you often feel tired or fatigued after your sleep? 0 filed at 07/26/2024 1204   Has anyone ever observed you stop breathing in your sleep? 0 filed at 07/26/2024 1204   Do you have or are you being treated for high blood pressure? 0 filed at 07/26/2024 1204   Recent BMI (Calculated) 35.7 filed at 07/26/2024 1204   Is BMI greater than 35 kg/m2? 1=Yes filed at 07/26/2024 1204   Age older than 50 years old? 1=Yes filed at 07/26/2024 1204   Is your neck circumference greater than  17 inches (Male) or 16 inches (Female)? 1 filed at 2024 1204   Gender - Male 1=Yes filed at 2024 1204   STOP-BANG Total Score 4 filed at 2024 1204          Prodigy: High Risk  Total Score: 8              Prodigy Gender Score          ARISCAT Score for Postoperative Pulmonary Complications    No data to display       Encarnacion Perioperative Risk for Myocardial Infarction or Cardiac Arrest (FREDI)    No data to display     Providers:                                                                                                             Cardiology: Toby Briggs 23 follow-up of his treadmill exercise stress test. On his last visit he presented with chest discomfort. He is also noted to be bradycardic   Preprocedure evaluation. Cleared for procedure with an acceptable cardiac risk.       Urology: Agapito Coyne 22 presents for discussion of bladder cancer diagnosed 2023, cysto, bladder bx and RGP, right posterior wall CIS. He has failed BCG in the past per reports. Referred by Dr. Lynch at [facility].   PLAN:  I do not think he meets BCG unresponsive  NMIBC based on his history and I would have either given induction BCG x2 or mBCG last summer at time of first failure  I would recommend OR in 3 months for cysto blue light with bladder bx and prostatic urethral bx either directed or not directed          --TESTING:        - CT Urogram: 24 at [facility]  Mild dilation of the distal right ureter with compared to the left is nonspecific and may be due to peristalsis however lesion at the right ureterovesicular junction is not excluded.     Wall thickening of the urinary bladder may be due to incomplete distention or cystitis.     No urinary tract calculi identified.       - EK24  Sinus bradycardia  Otherwise normal ECG  When compared with ECG of 2023 15:06,  Questionable change in QRS axis  T wave inversion now evident in Inferior leads        - Stress Test: 23  Summary:  1.  Nuclear image results are reported separately.  2. Excellent exercise duration.  3. No angina reported.  4. Normal blood pressure response to exercise with reduced heart rate response to exercise.  5. Peak heart rate 134 (86% of predicted).  6. No ST segment shifts of ischemia.  7. Normal treadmill exercise stress test.      - CT A/P: 02/23/24  Impression      Mild dilation of the distal right ureter with compared to the left is nonspecific and may be due to peristalsis however lesion at the right ureterovesicular junction is not excluded.    Wall thickening of the urinary bladder may be due to incomplete distention or cystitis.    No urinary tract calculi identified.         - Hematocrit: 40.7 on 07/22/24        ___________________________________________________________________  **Data input only. No medications, history or providers verified             Dyana Ross LPN  Preadmission Testing

## 2024-11-11 ENCOUNTER — PRE-ADMISSION TESTING (OUTPATIENT)
Dept: PREADMISSION TESTING | Facility: HOSPITAL | Age: 54
End: 2024-11-11
Payer: COMMERCIAL

## 2024-11-11 VITALS
OXYGEN SATURATION: 96 % | SYSTOLIC BLOOD PRESSURE: 151 MMHG | DIASTOLIC BLOOD PRESSURE: 90 MMHG | WEIGHT: 255.2 LBS | HEIGHT: 71 IN | HEART RATE: 69 BPM | TEMPERATURE: 97.9 F | BODY MASS INDEX: 35.73 KG/M2

## 2024-11-11 DIAGNOSIS — C67.9 MALIGNANT NEOPLASM OF URINARY BLADDER, UNSPECIFIED SITE (MULTI): ICD-10-CM

## 2024-11-11 LAB
ANION GAP SERPL CALC-SCNC: 14 MMOL/L (ref 10–20)
APPEARANCE UR: ABNORMAL
APTT PPP: 31 SECONDS (ref 27–38)
BILIRUB UR STRIP.AUTO-MCNC: NEGATIVE MG/DL
BUN SERPL-MCNC: 16 MG/DL (ref 6–23)
CALCIUM SERPL-MCNC: 9.4 MG/DL (ref 8.6–10.6)
CHLORIDE SERPL-SCNC: 105 MMOL/L (ref 98–107)
CO2 SERPL-SCNC: 23 MMOL/L (ref 21–32)
COLOR UR: ABNORMAL
CREAT SERPL-MCNC: 0.69 MG/DL (ref 0.5–1.3)
EGFRCR SERPLBLD CKD-EPI 2021: >90 ML/MIN/1.73M*2
ERYTHROCYTE [DISTWIDTH] IN BLOOD BY AUTOMATED COUNT: 11.6 % (ref 11.5–14.5)
GLUCOSE SERPL-MCNC: 98 MG/DL (ref 74–99)
GLUCOSE UR STRIP.AUTO-MCNC: NORMAL MG/DL
HCT VFR BLD AUTO: 41.3 % (ref 41–52)
HGB BLD-MCNC: 14.6 G/DL (ref 13.5–17.5)
INR PPP: 1.1 (ref 0.9–1.1)
KETONES UR STRIP.AUTO-MCNC: NEGATIVE MG/DL
LEUKOCYTE ESTERASE UR QL STRIP.AUTO: NEGATIVE
MCH RBC QN AUTO: 32 PG (ref 26–34)
MCHC RBC AUTO-ENTMCNC: 35.4 G/DL (ref 32–36)
MCV RBC AUTO: 91 FL (ref 80–100)
NITRITE UR QL STRIP.AUTO: NEGATIVE
NRBC BLD-RTO: 0 /100 WBCS (ref 0–0)
PH UR STRIP.AUTO: 6.5 [PH]
PLATELET # BLD AUTO: 213 X10*3/UL (ref 150–450)
POTASSIUM SERPL-SCNC: 4.2 MMOL/L (ref 3.5–5.3)
PROT UR STRIP.AUTO-MCNC: NEGATIVE MG/DL
PROTHROMBIN TIME: 12 SECONDS (ref 9.8–12.8)
RBC # BLD AUTO: 4.56 X10*6/UL (ref 4.5–5.9)
RBC # UR STRIP.AUTO: NEGATIVE /UL
SODIUM SERPL-SCNC: 138 MMOL/L (ref 136–145)
SP GR UR STRIP.AUTO: 1.02
UROBILINOGEN UR STRIP.AUTO-MCNC: NORMAL MG/DL
WBC # BLD AUTO: 5.9 X10*3/UL (ref 4.4–11.3)

## 2024-11-11 PROCEDURE — 85027 COMPLETE CBC AUTOMATED: CPT

## 2024-11-11 PROCEDURE — 36415 COLL VENOUS BLD VENIPUNCTURE: CPT

## 2024-11-11 PROCEDURE — 81003 URINALYSIS AUTO W/O SCOPE: CPT

## 2024-11-11 PROCEDURE — 85610 PROTHROMBIN TIME: CPT

## 2024-11-11 PROCEDURE — 99204 OFFICE O/P NEW MOD 45 MIN: CPT | Performed by: NURSE PRACTITIONER

## 2024-11-11 PROCEDURE — 80048 BASIC METABOLIC PNL TOTAL CA: CPT

## 2024-11-11 ASSESSMENT — DUKE ACTIVITY SCORE INDEX (DASI)
CAN YOU TAKE CARE OF YOURSELF (EAT, DRESS, BATHE, OR USE TOILET): YES
DASI METS SCORE: 9.9
CAN YOU WALK A BLOCK OR TWO ON LEVEL GROUND: YES
CAN YOU RUN A SHORT DISTANCE: YES
CAN YOU PARTICIPATE IN MODERATE RECREATIONAL ACTIVITIES LIKE GOLF, BOWLING, DANCING, DOUBLES TENNIS OR THROWING A BASEBALL OR FOOTBALL: YES
CAN YOU DO MODERATE WORK AROUND THE HOUSE LIKE VACUUMING, SWEEPING FLOORS OR CARRYING GROCERIES: YES
CAN YOU WALK INDOORS, SUCH AS AROUND YOUR HOUSE: YES
CAN YOU DO HEAVY WORK AROUND THE HOUSE LIKE SCRUBBING FLOORS OR LIFTING AND MOVING HEAVY FURNITURE: YES
TOTAL_SCORE: 58.2
CAN YOU PARTICIPATE IN STRENOUS SPORTS LIKE SWIMMING, SINGLES TENNIS, FOOTBALL, BASKETBALL, OR SKIING: YES
CAN YOU DO YARD WORK LIKE RAKING LEAVES, WEEDING OR PUSHING A MOWER: YES
CAN YOU CLIMB A FLIGHT OF STAIRS OR WALK UP A HILL: YES
CAN YOU DO LIGHT WORK AROUND THE HOUSE LIKE DUSTING OR WASHING DISHES: YES
CAN YOU HAVE SEXUAL RELATIONS: YES

## 2024-11-11 ASSESSMENT — ENCOUNTER SYMPTOMS
GASTROINTESTINAL NEGATIVE: 1
CONSTITUTIONAL NEGATIVE: 1
MUSCULOSKELETAL NEGATIVE: 1
ENDOCRINE NEGATIVE: 1
RESPIRATORY NEGATIVE: 1
CARDIOVASCULAR NEGATIVE: 1
NEUROLOGICAL NEGATIVE: 1
NECK NEGATIVE: 1
EYES NEGATIVE: 1

## 2024-11-11 ASSESSMENT — PAIN - FUNCTIONAL ASSESSMENT: PAIN_FUNCTIONAL_ASSESSMENT: 0-10

## 2024-11-11 ASSESSMENT — PAIN SCALES - GENERAL: PAINLEVEL_OUTOF10: 0 - NO PAIN

## 2024-11-11 ASSESSMENT — LIFESTYLE VARIABLES: SMOKING_STATUS: NONSMOKER

## 2024-11-11 NOTE — H&P (VIEW-ONLY)
CPM/PAT Evaluation       Name: Cornell Larios (Cornell Larios)  /Age: 1970/54 y.o.     Visit Type:   In-Person       Chief Complaint:     HPI: Patient is a 54-year-old male scheduled for cystoscopy with tissue ablation on 2024 for treatment of bladder cancer.  The patient is referred by Dr. Agapito Coyne for preoperative evaluation of bladder cancer scheduled for surgery and hypertension.    Past Medical History:   Diagnosis Date    Bladder cancer (Multi)     bladder cancer diagnosed 2023    Hypertension        Past Surgical History:   Procedure Laterality Date    OTHER SURGICAL HISTORY      Bladder cancer removal       Patient  has no history on file for sexual activity.    Family History   Problem Relation Name Age of Onset    Stroke Mother      Stroke Father         No Known Allergies    Prior to Admission medications    Medication Sig Start Date End Date Taking? Authorizing Provider   losartan (Cozaar) 50 mg tablet Take 2 tablets (100 mg) by mouth once daily.   Yes Historical Provider, MD   naproxen (EC Naprosyn) 500 mg EC tablet Take 1 tablet (500 mg) by mouth once daily as needed for mild pain (1 - 3). Do not crush, chew, or split.   Yes Historical Provider, MD   acetaminophen (Tylenol) 325 mg tablet Take by mouth every 6 hours if needed for mild pain (1 - 3).  Patient not taking: Reported on 2024  Historical Provider, MD   multivit-min-FA-lycopene-boron (Bladder 2.2) 200-5-250 mcg-mg-mcg tablet Take 2 tablets by mouth once daily.  Patient not taking: Reported on 2024  Historical Provider, MD LAU ROS:   Constitutional:   neg    Neuro/Psych:   neg    Eyes:   neg     use of corrective lenses  Ears:   neg    Nose:   neg    Mouth:   neg    Throat:   neg    Neck:   neg    Cardio:   neg    Respiratory:   neg    Endocrine:   neg    GI:   neg    :   neg    Musculoskeletal:   neg    Hematologic:   neg    Skin:  neg        Physical Exam  Vitals  reviewed.   Constitutional:       Appearance: Normal appearance.      Comments: tiesha   HENT:      Head: Normocephalic.      Nose: Nose normal.      Mouth/Throat:      Mouth: Mucous membranes are moist.   Eyes:      Conjunctiva/sclera: Conjunctivae normal.   Neck:      Vascular: No carotid bruit.   Cardiovascular:      Rate and Rhythm: Normal rate and regular rhythm.      Pulses: Normal pulses.      Heart sounds: Normal heart sounds.   Pulmonary:      Effort: Pulmonary effort is normal.      Breath sounds: Normal breath sounds.   Abdominal:      Palpations: Abdomen is soft.      Tenderness: There is no abdominal tenderness.   Musculoskeletal:         General: Normal range of motion.      Cervical back: Normal range of motion.      Right lower leg: No edema.      Left lower leg: No edema.   Lymphadenopathy:      Cervical: No cervical adenopathy.   Skin:     General: Skin is warm and dry.      Capillary Refill: Capillary refill takes less than 2 seconds.   Neurological:      General: No focal deficit present.      Mental Status: He is alert and oriented to person, place, and time.   Psychiatric:         Mood and Affect: Mood normal.         Behavior: Behavior normal. Behavior is cooperative.         Thought Content: Thought content normal.         Judgment: Judgment normal.          PAT AIRWAY:   Airway:     Mallampati::  II    Neck ROM::  Full   upper dentures and lower dentures      Testing/Diagnostic:     Patient Specialist/PCP:     Visit Vitals  /90   Pulse 69   Temp 36.6 °C (97.9 °F) (Oral)       DASI Risk Score      Flowsheet Row Pre-Admission Testing from 11/11/2024 in [facility] Pre-Admission Testing from 7/26/2024 in [facility]   Can you take care of yourself (eat, dress, bathe, or use toilet)?  2.75 filed at 11/11/2024 1453 2.75 filed at 07/26/2024 1204   Can you walk indoors, such as around your house? 1.75 filed at 11/11/2024 1453 1.75 filed at 07/26/2024 1204   Can you  walk a block or two on level ground?  2.75 filed at 11/11/2024 1453 2.75 filed at 07/26/2024 1204   Can you climb a flight of stairs or walk up a hill? 5.5 filed at 11/11/2024 1453 5.5 filed at 07/26/2024 1204   Can you run a short distance? 8 filed at 11/11/2024 1453 0 filed at 07/26/2024 1204   Can you do light work around the house like dusting or washing dishes? 2.7 filed at 11/11/2024 1453 2.7 filed at 07/26/2024 1204   Can you do moderate work around the house like vacuuming, sweeping floors or carrying groceries? 3.5 filed at 11/11/2024 1453 3.5 filed at 07/26/2024 1204   Can you do heavy work around the house like scrubbing floors or lifting and moving heavy furniture?  8 filed at 11/11/2024 1453 8 filed at 07/26/2024 1204   Can you do yard work like raking leaves, weeding or pushing a mower? 4.5 filed at 11/11/2024 1453 4.5 filed at 07/26/2024 1204   Can you have sexual relations? 5.25 filed at 11/11/2024 1453 5.25 filed at 07/26/2024 1204   Can you participate in moderate recreational activities like golf, bowling, dancing, doubles tennis or throwing a baseball or football? 6 filed at 11/11/2024 1453 6 filed at 07/26/2024 1204   Can you participate in strenous sports like swimming, singles tennis, football, basketball, or skiing? 7.5 filed at 11/11/2024 1453 0 filed at 07/26/2024 1204   DASI SCORE 58.2 filed at 11/11/2024 1453 42.7 filed at 07/26/2024 1204   METS Score (Will be calculated only when all the questions are answered) 9.9 filed at 11/11/2024 1453 8 filed at 07/26/2024 1204          Caprini DVT Assessment      Flowsheet Row Pre-Admission Testing from 11/11/2024 in [facility]   DVT Score 6 filed at 11/11/2024 1505   Medical Factors Present cancer, chemotherapy, or previous malignancy filed at 11/11/2024 1505   Surgical Factors Minor surgery planned filed at 11/11/2024 1505   BMI 31-40 (Obesity) filed at 11/11/2024 1505          Modified Frailty Index      Flowsheet Row  Pre-Admission Testing from 11/11/2024 in [facility]   Non-independent functional status (problems with dressing, bathing, personal grooming, or cooking) 0 filed at 11/11/2024 1505   History of diabetes mellitus  0 filed at 11/11/2024 1505   History of COPD 0 filed at 11/11/2024 1505   History of CHF No filed at 11/11/2024 1505   History of MI 0 filed at 11/11/2024 1505   History of Percutaneous Coronary Intervention, Cardiac Surgery, or Angina No filed at 11/11/2024 1505   Hypertension requiring the use of medication  0.0909 filed at 11/11/2024 1505   Peripheral vascular disease 0 filed at 11/11/2024 1505   Impaired sensorium (cognitive impairement or loss, clouding, or delirium) 0 filed at 11/11/2024 1505   TIA or CVA withouy residual deficit 0 filed at 11/11/2024 1505   Cerebrovascular accident with deficit 0 filed at 11/11/2024 1505   Modified Frailty Index Calculator .0909 filed at 11/11/2024 1505          CHADS2 Stroke Risk  Current as of 2 hours ago        N/A 3 to 100%: High Risk   2 to < 3%: Medium Risk   0 to < 2%: Low Risk     Last Change: N/A          This score determines the patient's risk of having a stroke if the patient has atrial fibrillation.        This score is not applicable to this patient. Components are not calculated.          Revised Cardiac Risk Index      Flowsheet Row Pre-Admission Testing from 11/11/2024 in [facility]   High-Risk Surgery (Intraperitoneal, Intrathoracic,Suprainguinal vascular) 0 filed at 11/11/2024 1505   History of ischemic heart disease (History of MI, History of positive exercuse test, Current chest paint considered due to myocardial ischemia, Use of nitrate therapy, ECG with pathological Q Waves) 0 filed at 11/11/2024 1505   History of congestive heart failure (pulmonary edemia, bilateral rales or S3 gallop, Paroxysmal nocturnal dyspnea, CXR showing pulmonary vascular redistribution) 0 filed at 11/11/2024 1505   History of  cerebrovascular disease (Prior TIA or stroke) 0 filed at 11/11/2024 1505   Pre-operative insulin treatment 0 filed at 11/11/2024 1505   Pre-operative creatinine>2 mg/dl 0 filed at 11/11/2024 1505   Revised Cardiac Risk Calculator 0 filed at 11/11/2024 1505          Apfel Simplified Score      Flowsheet Row Pre-Admission Testing from 11/11/2024 in [facility]   Smoking status 1 filed at 11/11/2024 1505   History of motion sickness or PONV  0 filed at 11/11/2024 1505   Use of postoperative opioids 1 filed at 11/11/2024 1505   Gender - Female 0=No filed at 11/11/2024 1505   Apfel Simplified Score Calculator 2 filed at 11/11/2024 1505          Risk Analysis Index Results This Encounter    No data found in the last 10 encounters.       Stop Bang Score      Flowsheet Row Pre-Admission Testing from 11/11/2024 in [facility] Pre-Admission Testing from 7/26/2024 in [facility]   Do you snore loudly? 0 filed at 11/11/2024 1452 0 filed at 07/26/2024 1204   Do you often feel tired or fatigued after your sleep? 0 filed at 11/11/2024 1452 0 filed at 07/26/2024 1204   Has anyone ever observed you stop breathing in your sleep? 0 filed at 11/11/2024 1452 0 filed at 07/26/2024 1204   Do you have or are you being treated for high blood pressure? 1 filed at 11/11/2024 1452 0 filed at 07/26/2024 1204   Recent BMI (Calculated) 35.6 filed at 11/11/2024 1452 35.7 filed at 07/26/2024 1204   Is BMI greater than 35 kg/m2? 1=Yes filed at 11/11/2024 1452 1=Yes filed at 07/26/2024 1204   Age older than 50 years old? 1=Yes filed at 11/11/2024 1452 1=Yes filed at 07/26/2024 1204   Is your neck circumference greater than 17 inches (Male) or 16 inches (Female)? 1 filed at 11/11/2024 1452 1 filed at 07/26/2024 1204   Gender - Male 1=Yes filed at 11/11/2024 1452 1=Yes filed at 07/26/2024 1204   STOP-BANG Total Score 5 filed at 11/11/2024 1452 4 filed at 07/26/2024 1204          Prodigy: High Risk  Total  Score: 8              Prodigy Gender Score          ARISCAT Score for Postoperative Pulmonary Complications      Flowsheet Row Pre-Admission Testing from 11/11/2024 in [facility]   Age, years  1 filed at 11/11/2024 1505   Preoperative SpO2 0 filed at 11/11/2024 1505   Respiratory infection in the last month Either upper or lower (i.e., URI, bronchitis, pneumonia), with fever and antibiotic treatment 0 filed at 11/11/2024 1505   Preoperative anemoa (Hgb less than 10 g/dl) 0 filed at 11/11/2024 1505   Surgical incision  0 filed at 11/11/2024 1505   Duration of surgery  0 filed at 11/11/2024 1505   Emergency Procedure  0 filed at 11/11/2024 1505   ARISCAT Total Score  3 filed at 11/11/2024 1505          Shashank Perioperative Risk for Myocardial Infarction or Cardiac Arrest (FREDI)      Flowsheet Row Pre-Admission Testing from 11/11/2024 in [facility]   Age 1.12 filed at 11/11/2024 1505   Functional Status  0 filed at 11/11/2024 1505   ASA Class  -3.29 filed at 11/11/2024 1505   Creatinine 0 filed at 11/11/2024 1505   Type of Procedure  -0.26 filed at 11/11/2024 1505   FREDI Total Score  -7.68 filed at 11/11/2024 1505            Assessment and Plan:   Neuro:  No neurologic diagnoses, however, the patient is at an increased risk for perioperative stroke secondary to HTN and general anesthesia.    HEENT/Airway:  No diagnosis or significant findings on chart review or clinical presentation and evaluation.    Cardiovascular:  Hypertension managed on losartan (last dose day before surgery).. No additional preoperative testing is currently indicated.    EKG 07/26/2024 sinus bradycardia (51 bpm).    Cardiac stress test 06/14/23  Summary:  1. Nuclear image results are reported separately.  2. Excellent exercise duration.  3. No angina reported.  4. Normal blood pressure response to exercise with reduced heart rate response to exercise.  5. Peak heart rate 134 (86% of predicted).  6. No ST segment  shifts of ischemia.  7. Normal treadmill exercise stress test.    METS are 9.9    RCRI  0 which is 3.9% 30 day risk of MACE (risk for cardiac death, nonfatal myocardial infarction, and nonfactal cardiac arrest    FREDI score which indicates a 0% risk of intraoperative or 30-day postoperative MACE      Pulmonary:  No diagnosis or significant findings on chart review or clinical presentation and evaluation however see below risk screening tools.   Preoperative deep breathing educational handout provided to patient.    ARISCAT:   3   points which is a low (1.6%) risk of in-hospital post-op pulmonary complications     PRODIGY:  13  points which is a intermediate risk of post op opioid induced respiratory depression episodes    STOP BAN  points which is a high risk for moderate to severe MADHU. Patient to discuss risk factors with pcp post op.     Renal: bladder cancer scheduled for surgery    The patient is at increased risk of perioperative renal complications secondary to age>/= 54, male sex, and HTN. Preventative measures include preoperative BP control and hydration.     Endocrine:  No diagnosis or significant findings on chart review or clinical presentation and evaluation.    Hematologic:   No diagnosis or significant findings on chart review or clinical presentation and evaluation however see below risk screening tool.  Preoperative DVT educational handout provided to patient.    Caprini Score: 6   points which is a highest risk of perioperative VTE    Gastrointestinal:   No diagnosis or significant findings on chart review or clinical presentation and evaluation however see below risk screening tools.     EAT-10 score of   0   - self-perceived oropharyngeal dysphagia scale (0-40)     Apfel: 2 points 39% risk for post operative N/V    Infectious disease:  No diagnosis or significant findings on chart review or clinical presentation and evaluation.     Musculoskeletal:  No diagnosis or significant findings on  chart review or clinical presentation and evaluation.        Labs ordered  Recent Results (from the past week)   Basic Metabolic Panel    Collection Time: 11/11/24  3:12 PM   Result Value Ref Range    Glucose 98 74 - 99 mg/dL    Sodium 138 136 - 145 mmol/L    Potassium 4.2 3.5 - 5.3 mmol/L    Chloride 105 98 - 107 mmol/L    Bicarbonate 23 21 - 32 mmol/L    Anion Gap 14 10 - 20 mmol/L    Urea Nitrogen 16 6 - 23 mg/dL    Creatinine 0.69 0.50 - 1.30 mg/dL    eGFR >90 >60 mL/min/1.73m*2    Calcium 9.4 8.6 - 10.6 mg/dL   CBC    Collection Time: 11/11/24  3:12 PM   Result Value Ref Range    WBC 5.9 4.4 - 11.3 x10*3/uL    nRBC 0.0 0.0 - 0.0 /100 WBCs    RBC 4.56 4.50 - 5.90 x10*6/uL    Hemoglobin 14.6 13.5 - 17.5 g/dL    Hematocrit 41.3 41.0 - 52.0 %    MCV 91 80 - 100 fL    MCH 32.0 26.0 - 34.0 pg    MCHC 35.4 32.0 - 36.0 g/dL    RDW 11.6 11.5 - 14.5 %    Platelets 213 150 - 450 x10*3/uL   Coagulation Screen    Collection Time: 11/11/24  3:12 PM   Result Value Ref Range    Protime 12.0 9.8 - 12.8 seconds    INR 1.1 0.9 - 1.1    aPTT 31 27 - 38 seconds   Urinalysis with Reflex Culture and Microscopic    Collection Time: 11/11/24  3:12 PM   Result Value Ref Range    Color, Urine Light-Yellow Light-Yellow, Yellow, Dark-Yellow    Appearance, Urine Turbid (N) Clear    Specific Gravity, Urine 1.017 1.005 - 1.035    pH, Urine 6.5 5.0, 5.5, 6.0, 6.5, 7.0, 7.5, 8.0    Protein, Urine NEGATIVE NEGATIVE, 10 (TRACE), 20 (TRACE) mg/dL    Glucose, Urine Normal Normal mg/dL    Blood, Urine NEGATIVE NEGATIVE    Ketones, Urine NEGATIVE NEGATIVE mg/dL    Bilirubin, Urine NEGATIVE NEGATIVE    Urobilinogen, Urine Normal Normal mg/dL    Nitrite, Urine NEGATIVE NEGATIVE    Leukocyte Esterase, Urine NEGATIVE NEGATIVE   Extra Urine Gray Tube    Collection Time: 11/11/24  3:12 PM   Result Value Ref Range    Extra Tube Hold for add-ons.

## 2024-11-11 NOTE — CPM/PAT H&P
CPM/PAT Evaluation       Name: Cornell Larios (Cornell Larios)  /Age: 1970/54 y.o.     Visit Type:   In-Person       Chief Complaint:     HPI: Patient is a 54-year-old male scheduled for cystoscopy with tissue ablation on 2024 for treatment of bladder cancer.  The patient is referred by Dr. Agapito Coyne for preoperative evaluation of bladder cancer scheduled for surgery and hypertension.    Past Medical History:   Diagnosis Date    Bladder cancer (Multi)     bladder cancer diagnosed 2023    Hypertension        Past Surgical History:   Procedure Laterality Date    OTHER SURGICAL HISTORY      Bladder cancer removal       Patient  has no history on file for sexual activity.    Family History   Problem Relation Name Age of Onset    Stroke Mother      Stroke Father         No Known Allergies    Prior to Admission medications    Medication Sig Start Date End Date Taking? Authorizing Provider   losartan (Cozaar) 50 mg tablet Take 2 tablets (100 mg) by mouth once daily.   Yes Historical Provider, MD   naproxen (EC Naprosyn) 500 mg EC tablet Take 1 tablet (500 mg) by mouth once daily as needed for mild pain (1 - 3). Do not crush, chew, or split.   Yes Historical Provider, MD   acetaminophen (Tylenol) 325 mg tablet Take by mouth every 6 hours if needed for mild pain (1 - 3).  Patient not taking: Reported on 2024  Historical Provider, MD   multivit-min-FA-lycopene-boron (Bladder 2.2) 200-5-250 mcg-mg-mcg tablet Take 2 tablets by mouth once daily.  Patient not taking: Reported on 2024  Historical Provider, MD LAU ROS:   Constitutional:   neg    Neuro/Psych:   neg    Eyes:   neg     use of corrective lenses  Ears:   neg    Nose:   neg    Mouth:   neg    Throat:   neg    Neck:   neg    Cardio:   neg    Respiratory:   neg    Endocrine:   neg    GI:   neg    :   neg    Musculoskeletal:   neg    Hematologic:   neg    Skin:  neg        Physical Exam  Vitals  reviewed.   Constitutional:       Appearance: Normal appearance.      Comments: tiesha   HENT:      Head: Normocephalic.      Nose: Nose normal.      Mouth/Throat:      Mouth: Mucous membranes are moist.   Eyes:      Conjunctiva/sclera: Conjunctivae normal.   Neck:      Vascular: No carotid bruit.   Cardiovascular:      Rate and Rhythm: Normal rate and regular rhythm.      Pulses: Normal pulses.      Heart sounds: Normal heart sounds.   Pulmonary:      Effort: Pulmonary effort is normal.      Breath sounds: Normal breath sounds.   Abdominal:      Palpations: Abdomen is soft.      Tenderness: There is no abdominal tenderness.   Musculoskeletal:         General: Normal range of motion.      Cervical back: Normal range of motion.      Right lower leg: No edema.      Left lower leg: No edema.   Lymphadenopathy:      Cervical: No cervical adenopathy.   Skin:     General: Skin is warm and dry.      Capillary Refill: Capillary refill takes less than 2 seconds.   Neurological:      General: No focal deficit present.      Mental Status: He is alert and oriented to person, place, and time.   Psychiatric:         Mood and Affect: Mood normal.         Behavior: Behavior normal. Behavior is cooperative.         Thought Content: Thought content normal.         Judgment: Judgment normal.          PAT AIRWAY:   Airway:     Mallampati::  II    Neck ROM::  Full   upper dentures and lower dentures      Testing/Diagnostic:     Patient Specialist/PCP:     Visit Vitals  /90   Pulse 69   Temp 36.6 °C (97.9 °F) (Oral)       DASI Risk Score      Flowsheet Row Pre-Admission Testing from 11/11/2024 in [facility] Pre-Admission Testing from 7/26/2024 in [facility]   Can you take care of yourself (eat, dress, bathe, or use toilet)?  2.75 filed at 11/11/2024 1453 2.75 filed at 07/26/2024 1204   Can you walk indoors, such as around your house? 1.75 filed at 11/11/2024 1453 1.75 filed at 07/26/2024 1204   Can you  walk a block or two on level ground?  2.75 filed at 11/11/2024 1453 2.75 filed at 07/26/2024 1204   Can you climb a flight of stairs or walk up a hill? 5.5 filed at 11/11/2024 1453 5.5 filed at 07/26/2024 1204   Can you run a short distance? 8 filed at 11/11/2024 1453 0 filed at 07/26/2024 1204   Can you do light work around the house like dusting or washing dishes? 2.7 filed at 11/11/2024 1453 2.7 filed at 07/26/2024 1204   Can you do moderate work around the house like vacuuming, sweeping floors or carrying groceries? 3.5 filed at 11/11/2024 1453 3.5 filed at 07/26/2024 1204   Can you do heavy work around the house like scrubbing floors or lifting and moving heavy furniture?  8 filed at 11/11/2024 1453 8 filed at 07/26/2024 1204   Can you do yard work like raking leaves, weeding or pushing a mower? 4.5 filed at 11/11/2024 1453 4.5 filed at 07/26/2024 1204   Can you have sexual relations? 5.25 filed at 11/11/2024 1453 5.25 filed at 07/26/2024 1204   Can you participate in moderate recreational activities like golf, bowling, dancing, doubles tennis or throwing a baseball or football? 6 filed at 11/11/2024 1453 6 filed at 07/26/2024 1204   Can you participate in strenous sports like swimming, singles tennis, football, basketball, or skiing? 7.5 filed at 11/11/2024 1453 0 filed at 07/26/2024 1204   DASI SCORE 58.2 filed at 11/11/2024 1453 42.7 filed at 07/26/2024 1204   METS Score (Will be calculated only when all the questions are answered) 9.9 filed at 11/11/2024 1453 8 filed at 07/26/2024 1204          Caprini DVT Assessment      Flowsheet Row Pre-Admission Testing from 11/11/2024 in [facility]   DVT Score 6 filed at 11/11/2024 1505   Medical Factors Present cancer, chemotherapy, or previous malignancy filed at 11/11/2024 1505   Surgical Factors Minor surgery planned filed at 11/11/2024 1505   BMI 31-40 (Obesity) filed at 11/11/2024 1505          Modified Frailty Index      Flowsheet Row  Pre-Admission Testing from 11/11/2024 in [facility]   Non-independent functional status (problems with dressing, bathing, personal grooming, or cooking) 0 filed at 11/11/2024 1505   History of diabetes mellitus  0 filed at 11/11/2024 1505   History of COPD 0 filed at 11/11/2024 1505   History of CHF No filed at 11/11/2024 1505   History of MI 0 filed at 11/11/2024 1505   History of Percutaneous Coronary Intervention, Cardiac Surgery, or Angina No filed at 11/11/2024 1505   Hypertension requiring the use of medication  0.0909 filed at 11/11/2024 1505   Peripheral vascular disease 0 filed at 11/11/2024 1505   Impaired sensorium (cognitive impairement or loss, clouding, or delirium) 0 filed at 11/11/2024 1505   TIA or CVA withouy residual deficit 0 filed at 11/11/2024 1505   Cerebrovascular accident with deficit 0 filed at 11/11/2024 1505   Modified Frailty Index Calculator .0909 filed at 11/11/2024 1505          CHADS2 Stroke Risk  Current as of 2 hours ago        N/A 3 to 100%: High Risk   2 to < 3%: Medium Risk   0 to < 2%: Low Risk     Last Change: N/A          This score determines the patient's risk of having a stroke if the patient has atrial fibrillation.        This score is not applicable to this patient. Components are not calculated.          Revised Cardiac Risk Index      Flowsheet Row Pre-Admission Testing from 11/11/2024 in [facility]   High-Risk Surgery (Intraperitoneal, Intrathoracic,Suprainguinal vascular) 0 filed at 11/11/2024 1505   History of ischemic heart disease (History of MI, History of positive exercuse test, Current chest paint considered due to myocardial ischemia, Use of nitrate therapy, ECG with pathological Q Waves) 0 filed at 11/11/2024 1505   History of congestive heart failure (pulmonary edemia, bilateral rales or S3 gallop, Paroxysmal nocturnal dyspnea, CXR showing pulmonary vascular redistribution) 0 filed at 11/11/2024 1505   History of  cerebrovascular disease (Prior TIA or stroke) 0 filed at 11/11/2024 1505   Pre-operative insulin treatment 0 filed at 11/11/2024 1505   Pre-operative creatinine>2 mg/dl 0 filed at 11/11/2024 1505   Revised Cardiac Risk Calculator 0 filed at 11/11/2024 1505          Apfel Simplified Score      Flowsheet Row Pre-Admission Testing from 11/11/2024 in [facility]   Smoking status 1 filed at 11/11/2024 1505   History of motion sickness or PONV  0 filed at 11/11/2024 1505   Use of postoperative opioids 1 filed at 11/11/2024 1505   Gender - Female 0=No filed at 11/11/2024 1505   Apfel Simplified Score Calculator 2 filed at 11/11/2024 1505          Risk Analysis Index Results This Encounter    No data found in the last 10 encounters.       Stop Bang Score      Flowsheet Row Pre-Admission Testing from 11/11/2024 in [facility] Pre-Admission Testing from 7/26/2024 in [facility]   Do you snore loudly? 0 filed at 11/11/2024 1452 0 filed at 07/26/2024 1204   Do you often feel tired or fatigued after your sleep? 0 filed at 11/11/2024 1452 0 filed at 07/26/2024 1204   Has anyone ever observed you stop breathing in your sleep? 0 filed at 11/11/2024 1452 0 filed at 07/26/2024 1204   Do you have or are you being treated for high blood pressure? 1 filed at 11/11/2024 1452 0 filed at 07/26/2024 1204   Recent BMI (Calculated) 35.6 filed at 11/11/2024 1452 35.7 filed at 07/26/2024 1204   Is BMI greater than 35 kg/m2? 1=Yes filed at 11/11/2024 1452 1=Yes filed at 07/26/2024 1204   Age older than 50 years old? 1=Yes filed at 11/11/2024 1452 1=Yes filed at 07/26/2024 1204   Is your neck circumference greater than 17 inches (Male) or 16 inches (Female)? 1 filed at 11/11/2024 1452 1 filed at 07/26/2024 1204   Gender - Male 1=Yes filed at 11/11/2024 1452 1=Yes filed at 07/26/2024 1204   STOP-BANG Total Score 5 filed at 11/11/2024 1452 4 filed at 07/26/2024 1204          Prodigy: High Risk  Total  Score: 8              Prodigy Gender Score          ARISCAT Score for Postoperative Pulmonary Complications      Flowsheet Row Pre-Admission Testing from 11/11/2024 in [facility]   Age, years  1 filed at 11/11/2024 1505   Preoperative SpO2 0 filed at 11/11/2024 1505   Respiratory infection in the last month Either upper or lower (i.e., URI, bronchitis, pneumonia), with fever and antibiotic treatment 0 filed at 11/11/2024 1505   Preoperative anemoa (Hgb less than 10 g/dl) 0 filed at 11/11/2024 1505   Surgical incision  0 filed at 11/11/2024 1505   Duration of surgery  0 filed at 11/11/2024 1505   Emergency Procedure  0 filed at 11/11/2024 1505   ARISCAT Total Score  3 filed at 11/11/2024 1505          Shashank Perioperative Risk for Myocardial Infarction or Cardiac Arrest (FREDI)      Flowsheet Row Pre-Admission Testing from 11/11/2024 in [facility]   Age 1.12 filed at 11/11/2024 1505   Functional Status  0 filed at 11/11/2024 1505   ASA Class  -3.29 filed at 11/11/2024 1505   Creatinine 0 filed at 11/11/2024 1505   Type of Procedure  -0.26 filed at 11/11/2024 1505   FREDI Total Score  -7.68 filed at 11/11/2024 1505            Assessment and Plan:   Neuro:  No neurologic diagnoses, however, the patient is at an increased risk for perioperative stroke secondary to HTN and general anesthesia.    HEENT/Airway:  No diagnosis or significant findings on chart review or clinical presentation and evaluation.    Cardiovascular:  Hypertension managed on losartan (last dose day before surgery).. No additional preoperative testing is currently indicated.    EKG 07/26/2024 sinus bradycardia (51 bpm).    Cardiac stress test 06/14/23  Summary:  1. Nuclear image results are reported separately.  2. Excellent exercise duration.  3. No angina reported.  4. Normal blood pressure response to exercise with reduced heart rate response to exercise.  5. Peak heart rate 134 (86% of predicted).  6. No ST segment  shifts of ischemia.  7. Normal treadmill exercise stress test.    METS are 9.9    RCRI  0 which is 3.9% 30 day risk of MACE (risk for cardiac death, nonfatal myocardial infarction, and nonfactal cardiac arrest    FREDI score which indicates a 0% risk of intraoperative or 30-day postoperative MACE      Pulmonary:  No diagnosis or significant findings on chart review or clinical presentation and evaluation however see below risk screening tools.   Preoperative deep breathing educational handout provided to patient.    ARISCAT:   3   points which is a low (1.6%) risk of in-hospital post-op pulmonary complications     PRODIGY:  13  points which is a intermediate risk of post op opioid induced respiratory depression episodes    STOP BAN  points which is a high risk for moderate to severe MADHU. Patient to discuss risk factors with pcp post op.     Renal: bladder cancer scheduled for surgery    The patient is at increased risk of perioperative renal complications secondary to age>/= 54, male sex, and HTN. Preventative measures include preoperative BP control and hydration.     Endocrine:  No diagnosis or significant findings on chart review or clinical presentation and evaluation.    Hematologic:   No diagnosis or significant findings on chart review or clinical presentation and evaluation however see below risk screening tool.  Preoperative DVT educational handout provided to patient.    Caprini Score: 6   points which is a highest risk of perioperative VTE    Gastrointestinal:   No diagnosis or significant findings on chart review or clinical presentation and evaluation however see below risk screening tools.     EAT-10 score of   0   - self-perceived oropharyngeal dysphagia scale (0-40)     Apfel: 2 points 39% risk for post operative N/V    Infectious disease:  No diagnosis or significant findings on chart review or clinical presentation and evaluation.     Musculoskeletal:  No diagnosis or significant findings on  chart review or clinical presentation and evaluation.        Labs ordered  Recent Results (from the past week)   Basic Metabolic Panel    Collection Time: 11/11/24  3:12 PM   Result Value Ref Range    Glucose 98 74 - 99 mg/dL    Sodium 138 136 - 145 mmol/L    Potassium 4.2 3.5 - 5.3 mmol/L    Chloride 105 98 - 107 mmol/L    Bicarbonate 23 21 - 32 mmol/L    Anion Gap 14 10 - 20 mmol/L    Urea Nitrogen 16 6 - 23 mg/dL    Creatinine 0.69 0.50 - 1.30 mg/dL    eGFR >90 >60 mL/min/1.73m*2    Calcium 9.4 8.6 - 10.6 mg/dL   CBC    Collection Time: 11/11/24  3:12 PM   Result Value Ref Range    WBC 5.9 4.4 - 11.3 x10*3/uL    nRBC 0.0 0.0 - 0.0 /100 WBCs    RBC 4.56 4.50 - 5.90 x10*6/uL    Hemoglobin 14.6 13.5 - 17.5 g/dL    Hematocrit 41.3 41.0 - 52.0 %    MCV 91 80 - 100 fL    MCH 32.0 26.0 - 34.0 pg    MCHC 35.4 32.0 - 36.0 g/dL    RDW 11.6 11.5 - 14.5 %    Platelets 213 150 - 450 x10*3/uL   Coagulation Screen    Collection Time: 11/11/24  3:12 PM   Result Value Ref Range    Protime 12.0 9.8 - 12.8 seconds    INR 1.1 0.9 - 1.1    aPTT 31 27 - 38 seconds   Urinalysis with Reflex Culture and Microscopic    Collection Time: 11/11/24  3:12 PM   Result Value Ref Range    Color, Urine Light-Yellow Light-Yellow, Yellow, Dark-Yellow    Appearance, Urine Turbid (N) Clear    Specific Gravity, Urine 1.017 1.005 - 1.035    pH, Urine 6.5 5.0, 5.5, 6.0, 6.5, 7.0, 7.5, 8.0    Protein, Urine NEGATIVE NEGATIVE, 10 (TRACE), 20 (TRACE) mg/dL    Glucose, Urine Normal Normal mg/dL    Blood, Urine NEGATIVE NEGATIVE    Ketones, Urine NEGATIVE NEGATIVE mg/dL    Bilirubin, Urine NEGATIVE NEGATIVE    Urobilinogen, Urine Normal Normal mg/dL    Nitrite, Urine NEGATIVE NEGATIVE    Leukocyte Esterase, Urine NEGATIVE NEGATIVE   Extra Urine Gray Tube    Collection Time: 11/11/24  3:12 PM   Result Value Ref Range    Extra Tube Hold for add-ons.

## 2024-11-11 NOTE — PREPROCEDURE INSTRUCTIONS
Thank you for visiting [facility] ([facility]) today for your pre-procedure evaluation, you were seen by     Samantha Meeson, MSN, NP-C  Adult-Gerontology Nurse Practitioner II  Department of Anesthesiology and Perioperative Medicine  Main phone [phone]  Direct phone [phone]  Fax [phone]    This summary includes instructions and information to aid you during your perioperative period.  Please read carefully. If you have any questions about your visit today, please call the number listed above.  If you become ill or have any changes to your health before your surgery, please contact your primary care provider and alert your surgeon.    Preparing for your Surgery       Exercises  Preoperative Deep Breathing Exercises  Why it is important to do deep breathing exercises before my surgery?  Deep breathing exercises strengthen your breathing muscles.  This helps you to recover after your surgery and decreases the chance of breathing complications.  How are the deep breathing exercises done?  Sit straight with your back supported.  Breathe in deeply and slowly through your nose. Your lower rib cage should expand and your abdomen may move forward.  Hold that breath for 3 to 5 seconds.  Breathe out through pursed lips, slowly and completely.  Rest and repeat 10 times every hour while awake.  Rest longer if you become dizzy or lightheaded.       Incentive Spirometer   You were provided with an incentive spirometer in CPM/PAT, please follow the below instructions.   You were not provided an incentive spirometer in CPM, please disregard the incentive spirometer instructions  What is an incentive spirometer?  An incentive spirometer is a device used before and after surgery to “exercise” your lungs.  It helps you to take deeper breaths to expand your lungs.  Below is an example of a basic incentive spirometer.  The device you receive may differ slightly but they all function the  same.    Why do I need to use an incentive spirometer?  Using your incentive spirometer prepares your lungs for surgery and helps prevent lung problems after surgery.  How do I use my incentive spirometer?  When you're using your incentive spirometer, make sure to breathe through your mouth. If you breathe through your nose, the incentive spirometer won't work properly. You can hold your nose if you have trouble.  If you feel dizzy at any time, stop and rest. Try again at a later time.  Follow the steps below:  Set up your incentive spirometer, expand the flexible tubing and connect to the outlet.  Sit upright in a chair or bed. Hold the incentive spirometer at eye level.   Put the mouthpiece in your mouth and close your lips tightly around it. Slowly breathe out (exhale) completely.  Breathe in (inhale) slowly through your mouth as deeply as you can. As you take a breath, you will see the piston rise inside the large column. While the piston rises, the indicator should move upwards. It should stay in between the 2 arrows (see Figure).  Try to get the piston as high as you can, while keeping the indicator between the arrows.   If the indicator doesn't stay between the arrows, you're breathing either too fast or too slow.  When you get it as high as you can, hold your breath for 10 seconds, or as long as possible. While you're holding your breath, the piston will slowly fall to the base of the spirometer.  Once the piston reaches the bottom of the spirometer, breathe out slowly through your mouth. Rest for a few seconds.  Repeat 10 times. Try to get the piston to the same level with each breath.  Repeat every hour while awake  You can carefully clean the outside of the mouthpiece with an alcohol wipe or soap and water.      Preoperative Brain Exercises    What are brain exercises?  A brain exercise is any activity that engages your thinking (cognitive) skills.    What types of activities are considered brain  exercises?  Jigsaw puzzles, crossword puzzles, word jumble, memory games, word search, and many more.  Many can be found free online or on your phone via a mobile angela.    Why should I do brain exercises before my surgery?  More recent research has shown brain exercise before surgery can lower the risk of postoperative delirium (confusion) which can be especially important for older adults.  Patients who did brain exercises for 5 to 10 hours the days before surgery, cut their risk of postoperative delirium in half up to 1 week after surgery.    Sit-to-Stand Exercise    What is the sit-to-stand exercise?  The sit-to-stand exercise strengthens the muscles of your lower body and muscles in the center of your body (core muscles for stability) helping to maintain and improve your strength and mobility.  How do I do the sit-to-stand exercise?  The goal is to do this exercise without using your arms or hands.  If this is too difficult, use your arms and hands or a chair with armrests to help slowly push yourself to the standing position and lower yourself back to the sitting position. As the movement becomes easier use your arms and hands less.    Steps to the sit-to-stand exercise  Sit up tall in a sturdy chair, knees bent, feet flat on the floor shoulder-width apart.  Shift your hips/pelvis forward in the chair to correctly position yourself for the next movement.  Lean forward at your hips.  Stand up straight putting equal weight on both feet.  Check to be sure you are properly aligned with the chair, in a slow controlled movement sit back down.  Repeat this exercise 10-15 times.  If needed you can do it fewer times until your strength improves.  Rest for 1 minute.  Do another 10-15 sit-to-stand exercises.  Try to do this in the morning and evening.        Instructions    Preoperative Fasting Guidelines    Why must I stop eating and drinking near surgery time?  With sedation, food or liquid in your stomach can enter your  lungs causing serious complications  Food can increase nausea and vomiting  When do I need to stop eating and drinking before my surgery?      Do not eat any food after midnight the night before your surgery/procedure. You may have up to 13.5 ounces of clear liquid until TWO hours before your instructed arrival time to the hospital.  This includes water, black tea/coffee, (no milk or cream) apple juice, and electrolyte drinks (Gatorade). You may chew gum until TWO hours before your surgery/procedure            Simple things you can do to help prevent blood clots     Blood clots are blockages that can form in the body's veins. When a blood clot forms in your deep veins, it may be called a deep vein thrombosis, or DVT for short. Blood clots can happen in any part of the body where blood flows, but they are most common in the arms and legs. If a piece of a blood clot breaks free and travels to the lungs, it is called a pulmonary embolus (PE). A PE can be a very serious problem.         Being in the hospital or having surgery can raise your chances of getting a blood clot because you may not be well enough to move around as much as you normally do.         Ways you can help prevent blood clots in the hospital       Wearing SCDs  SCDs stands for Sequential Compression Devices.   SCDs are special sleeves that wrap around your legs. They attach to a pump that fills them with air to gently squeeze your legs every few minutes.  This helps return the blood in your legs to your heart.   SCDs should only be taken off when walking or bathing. SCDs may not be comfortable, but they can help save your life.              Pump SCD leg sleeves  Wearing compression stockings - if your doctor orders them. These special snug-fitting stockings gently squeeze your legs to help blood flow.       Walking. Walking helps move the blood in your legs.   If your doctor says it is ok, try walking the halls at least   5 times a day. Ask us to help  you get up, so you don't fall.      Taking any blood-thinning medicines your doctor orders.              Ways you can help prevent blood clots at home         Wearing compression stockings - if your doctor orders them.   Walking - to help move the blood in your legs.    Taking any blood-thinning medicines your doctor orders.      Signs of a blood clot or PE    Tell your doctor or nurse right away if you have any of the problems listed below.         If you are at home, seek medical care right away. Call 911 for chest pain or problems breathing.            Signs of a blood clot (DVT) - such as pain, swelling, redness, or warmth in your arm or legs.  Signs of a pulmonary embolism (PE) - such as chest pain or feeling short of breath      Tobacco and Alcohol;  Do not drink alcohol or smoke within 24 hours of surgery.  It is best to quit smoking for as long as possible before any surgery or procedure.      The Week before Surgery        Seven days before Surgery  Check your [facility] medication instructions  Do the exercises provided to you by CPM   Arrange for a responsible, adult licensed  to take you home after surgery and stay with you for 24 hours.  You will not be permitted to drive yourself home if you have received any anesthetic/sedation  Six days before surgery  Check your C[facility] medication instructions  Do the exercises provided to you by CPM   Start using Chlorhexidene (CHG) body wash if prescribed  Five days before surgery  Check your C[facility] medication instructions  Do the exercises provided to you by CPM   Continue to use CHG body wash if prescribed  Three days before surgery  Check your CPM medication instructions  Do the exercises provided to you by CPM   Continue to use CHG body wash if prescribed  Two days before surgery  Check your CPM medication instructions  Do the exercises provided to you by CPM   Continue to use CHG body wash if prescribed    The Day before Surgery       Check your CPM medication and  all other CPM instructions including when to stop eating and drinking  You will be called with your arrival time for surgery in the late afternoon.  If you do not receive a call please reach out to your surgeon's office.  Do not smoke or drink 24 hours before surgery  Prepare items to bring with you to the hospital  Shower with your chlorhexidine wash if prescribed  Brush your teeth and use your chlorhexidine dental rinse if prescribed    The Day of Surgery       Check your [facility] medication instructions  Ensure you follow the instructions for when to stop eating and drinking  Shower, if prescribed use CHG.  Do not apply any lotions, creams, moisturizers, perfume or deodorant  Brush your teeth and use your CHG dental rinse if prescribed  Wear loose comfortable clothing  Avoid make-up  Remove  jewelry and piercings, consider professional piercing removal with a plastic spacer if needed  Bring photo ID and Insurance card  Bring an accurate medication list that includes medication dose, frequency and allergies  Bring a copy of your advanced directives (will, health care power of )  Bring any devices and controllers as well as medical devices you have been provided with for surgery (CPAP, slings, braces, etc.)  Dentures, eyeglasses, and contacts will be removed before surgery, please bring cases for contacts or glasses

## 2024-11-12 LAB — HOLD SPECIMEN: NORMAL

## 2024-11-25 ENCOUNTER — ANESTHESIA EVENT (OUTPATIENT)
Dept: OPERATING ROOM | Facility: HOSPITAL | Age: 54
End: 2024-11-25
Payer: COMMERCIAL

## 2024-11-25 ENCOUNTER — APPOINTMENT (OUTPATIENT)
Dept: RADIOLOGY | Facility: HOSPITAL | Age: 54
End: 2024-11-25
Payer: COMMERCIAL

## 2024-11-25 ENCOUNTER — ANESTHESIA (OUTPATIENT)
Dept: OPERATING ROOM | Facility: HOSPITAL | Age: 54
End: 2024-11-25
Payer: COMMERCIAL

## 2024-11-25 ENCOUNTER — HOSPITAL ENCOUNTER (OUTPATIENT)
Facility: HOSPITAL | Age: 54
Setting detail: OUTPATIENT SURGERY
Discharge: HOME | End: 2024-11-25
Attending: STUDENT IN AN ORGANIZED HEALTH CARE EDUCATION/TRAINING PROGRAM | Admitting: STUDENT IN AN ORGANIZED HEALTH CARE EDUCATION/TRAINING PROGRAM
Payer: COMMERCIAL

## 2024-11-25 VITALS
BODY MASS INDEX: 35.79 KG/M2 | OXYGEN SATURATION: 96 % | TEMPERATURE: 97 F | DIASTOLIC BLOOD PRESSURE: 64 MMHG | HEART RATE: 68 BPM | HEIGHT: 70 IN | RESPIRATION RATE: 11 BRPM | WEIGHT: 250 LBS | SYSTOLIC BLOOD PRESSURE: 137 MMHG

## 2024-11-25 DIAGNOSIS — C67.9 MALIGNANT NEOPLASM OF URINARY BLADDER, UNSPECIFIED SITE (MULTI): ICD-10-CM

## 2024-11-25 PROCEDURE — 2500000002 HC RX 250 W HCPCS SELF ADMINISTERED DRUGS (ALT 637 FOR MEDICARE OP, ALT 636 FOR OP/ED)

## 2024-11-25 PROCEDURE — 3600000003 HC OR TIME - INITIAL BASE CHARGE - PROCEDURE LEVEL THREE: Performed by: STUDENT IN AN ORGANIZED HEALTH CARE EDUCATION/TRAINING PROGRAM

## 2024-11-25 PROCEDURE — 7100000002 HC RECOVERY ROOM TIME - EACH INCREMENTAL 1 MINUTE: Performed by: STUDENT IN AN ORGANIZED HEALTH CARE EDUCATION/TRAINING PROGRAM

## 2024-11-25 PROCEDURE — 7100000001 HC RECOVERY ROOM TIME - INITIAL BASE CHARGE: Performed by: STUDENT IN AN ORGANIZED HEALTH CARE EDUCATION/TRAINING PROGRAM

## 2024-11-25 PROCEDURE — 88342 IMHCHEM/IMCYTCHM 1ST ANTB: CPT | Performed by: STUDENT IN AN ORGANIZED HEALTH CARE EDUCATION/TRAINING PROGRAM

## 2024-11-25 PROCEDURE — A9589 INSTI HEXAMINOLEVULINATE HCL: HCPCS | Performed by: STUDENT IN AN ORGANIZED HEALTH CARE EDUCATION/TRAINING PROGRAM

## 2024-11-25 PROCEDURE — 52204 CYSTOSCOPY W/BIOPSY(S): CPT | Performed by: STUDENT IN AN ORGANIZED HEALTH CARE EDUCATION/TRAINING PROGRAM

## 2024-11-25 PROCEDURE — C1758 CATHETER, URETERAL: HCPCS | Performed by: STUDENT IN AN ORGANIZED HEALTH CARE EDUCATION/TRAINING PROGRAM

## 2024-11-25 PROCEDURE — A52235 PR CYSTOURETHROSCOPY,FULGUR 2-5 CM LESN

## 2024-11-25 PROCEDURE — 7100000010 HC PHASE TWO TIME - EACH INCREMENTAL 1 MINUTE: Performed by: STUDENT IN AN ORGANIZED HEALTH CARE EDUCATION/TRAINING PROGRAM

## 2024-11-25 PROCEDURE — 2500000004 HC RX 250 GENERAL PHARMACY W/ HCPCS (ALT 636 FOR OP/ED)

## 2024-11-25 PROCEDURE — 7100000009 HC PHASE TWO TIME - INITIAL BASE CHARGE: Performed by: STUDENT IN AN ORGANIZED HEALTH CARE EDUCATION/TRAINING PROGRAM

## 2024-11-25 PROCEDURE — 3700000001 HC GENERAL ANESTHESIA TIME - INITIAL BASE CHARGE: Performed by: STUDENT IN AN ORGANIZED HEALTH CARE EDUCATION/TRAINING PROGRAM

## 2024-11-25 PROCEDURE — 2500000005 HC RX 250 GENERAL PHARMACY W/O HCPCS: Performed by: STUDENT IN AN ORGANIZED HEALTH CARE EDUCATION/TRAINING PROGRAM

## 2024-11-25 PROCEDURE — 2500000004 HC RX 250 GENERAL PHARMACY W/ HCPCS (ALT 636 FOR OP/ED): Performed by: STUDENT IN AN ORGANIZED HEALTH CARE EDUCATION/TRAINING PROGRAM

## 2024-11-25 PROCEDURE — 74420 UROGRAPHY RTRGR +-KUB: CPT | Performed by: STUDENT IN AN ORGANIZED HEALTH CARE EDUCATION/TRAINING PROGRAM

## 2024-11-25 PROCEDURE — 96372 THER/PROPH/DIAG INJ SC/IM: CPT

## 2024-11-25 PROCEDURE — 88112 CYTOPATH CELL ENHANCE TECH: CPT | Mod: TC,MCY | Performed by: STUDENT IN AN ORGANIZED HEALTH CARE EDUCATION/TRAINING PROGRAM

## 2024-11-25 PROCEDURE — 2500000001 HC RX 250 WO HCPCS SELF ADMINISTERED DRUGS (ALT 637 FOR MEDICARE OP)

## 2024-11-25 PROCEDURE — 3600000008 HC OR TIME - EACH INCREMENTAL 1 MINUTE - PROCEDURE LEVEL THREE: Performed by: STUDENT IN AN ORGANIZED HEALTH CARE EDUCATION/TRAINING PROGRAM

## 2024-11-25 PROCEDURE — A52235 PR CYSTOURETHROSCOPY,FULGUR 2-5 CM LESN: Performed by: ANESTHESIOLOGY

## 2024-11-25 PROCEDURE — 88305 TISSUE EXAM BY PATHOLOGIST: CPT | Performed by: STUDENT IN AN ORGANIZED HEALTH CARE EDUCATION/TRAINING PROGRAM

## 2024-11-25 PROCEDURE — 2720000007 HC OR 272 NO HCPCS: Performed by: STUDENT IN AN ORGANIZED HEALTH CARE EDUCATION/TRAINING PROGRAM

## 2024-11-25 PROCEDURE — 88112 CYTOPATH CELL ENHANCE TECH: CPT | Performed by: PATHOLOGY

## 2024-11-25 PROCEDURE — 3700000002 HC GENERAL ANESTHESIA TIME - EACH INCREMENTAL 1 MINUTE: Performed by: STUDENT IN AN ORGANIZED HEALTH CARE EDUCATION/TRAINING PROGRAM

## 2024-11-25 PROCEDURE — 2550000001 HC RX 255 CONTRASTS: Performed by: STUDENT IN AN ORGANIZED HEALTH CARE EDUCATION/TRAINING PROGRAM

## 2024-11-25 PROCEDURE — C1769 GUIDE WIRE: HCPCS | Performed by: STUDENT IN AN ORGANIZED HEALTH CARE EDUCATION/TRAINING PROGRAM

## 2024-11-25 PROCEDURE — 88342 IMHCHEM/IMCYTCHM 1ST ANTB: CPT | Mod: TC,SUR | Performed by: STUDENT IN AN ORGANIZED HEALTH CARE EDUCATION/TRAINING PROGRAM

## 2024-11-25 RX ORDER — ONDANSETRON HYDROCHLORIDE 2 MG/ML
4 INJECTION, SOLUTION INTRAVENOUS ONCE AS NEEDED
Status: DISCONTINUED | OUTPATIENT
Start: 2024-11-25 | End: 2024-11-25 | Stop reason: HOSPADM

## 2024-11-25 RX ORDER — OXYCODONE HYDROCHLORIDE 5 MG/1
5 TABLET ORAL EVERY 4 HOURS PRN
Status: DISCONTINUED | OUTPATIENT
Start: 2024-11-25 | End: 2024-11-25 | Stop reason: HOSPADM

## 2024-11-25 RX ORDER — ONDANSETRON HYDROCHLORIDE 2 MG/ML
INJECTION, SOLUTION INTRAVENOUS AS NEEDED
Status: DISCONTINUED | OUTPATIENT
Start: 2024-11-25 | End: 2024-11-25

## 2024-11-25 RX ORDER — PROPOFOL 10 MG/ML
INJECTION, EMULSION INTRAVENOUS AS NEEDED
Status: DISCONTINUED | OUTPATIENT
Start: 2024-11-25 | End: 2024-11-25

## 2024-11-25 RX ORDER — CEFAZOLIN SODIUM 2 G/100ML
2 INJECTION, SOLUTION INTRAVENOUS ONCE
Status: DISCONTINUED | OUTPATIENT
Start: 2024-11-25 | End: 2024-11-25 | Stop reason: HOSPADM

## 2024-11-25 RX ORDER — CHLORHEXIDINE GLUCONATE 40 MG/ML
SOLUTION TOPICAL DAILY PRN
Status: DISCONTINUED | OUTPATIENT
Start: 2024-11-25 | End: 2024-11-25 | Stop reason: HOSPADM

## 2024-11-25 RX ORDER — DEXAMETHASONE SODIUM PHOSPHATE 10 MG/ML
INJECTION INTRAMUSCULAR; INTRAVENOUS AS NEEDED
Status: DISCONTINUED | OUTPATIENT
Start: 2024-11-25 | End: 2024-11-25

## 2024-11-25 RX ORDER — DROPERIDOL 2.5 MG/ML
INJECTION, SOLUTION INTRAMUSCULAR; INTRAVENOUS AS NEEDED
Status: DISCONTINUED | OUTPATIENT
Start: 2024-11-25 | End: 2024-11-25

## 2024-11-25 RX ORDER — OXYBUTYNIN CHLORIDE 5 MG/1
5 TABLET ORAL 2 TIMES DAILY
Status: DISCONTINUED | OUTPATIENT
Start: 2024-11-25 | End: 2024-11-25 | Stop reason: HOSPADM

## 2024-11-25 RX ORDER — LIDOCAINE HYDROCHLORIDE 10 MG/ML
0.1 INJECTION, SOLUTION EPIDURAL; INFILTRATION; INTRACAUDAL; PERINEURAL ONCE
Status: DISCONTINUED | OUTPATIENT
Start: 2024-11-25 | End: 2024-11-25 | Stop reason: HOSPADM

## 2024-11-25 RX ORDER — SODIUM CHLORIDE 0.9 G/100ML
IRRIGANT IRRIGATION AS NEEDED
Status: DISCONTINUED | OUTPATIENT
Start: 2024-11-25 | End: 2024-11-25 | Stop reason: HOSPADM

## 2024-11-25 RX ORDER — PHENAZOPYRIDINE HYDROCHLORIDE 100 MG/1
95 TABLET, FILM COATED ORAL
Status: DISCONTINUED | OUTPATIENT
Start: 2024-11-25 | End: 2024-11-25 | Stop reason: HOSPADM

## 2024-11-25 RX ORDER — MIDAZOLAM HYDROCHLORIDE 1 MG/ML
INJECTION INTRAMUSCULAR; INTRAVENOUS AS NEEDED
Status: DISCONTINUED | OUTPATIENT
Start: 2024-11-25 | End: 2024-11-25

## 2024-11-25 RX ORDER — ACETAMINOPHEN 500 MG
1000 TABLET ORAL EVERY 6 HOURS PRN
Qty: 30 TABLET | Refills: 0 | Status: SHIPPED | OUTPATIENT
Start: 2024-11-25 | End: 2024-12-05

## 2024-11-25 RX ORDER — FENTANYL CITRATE 50 UG/ML
INJECTION, SOLUTION INTRAMUSCULAR; INTRAVENOUS AS NEEDED
Status: DISCONTINUED | OUTPATIENT
Start: 2024-11-25 | End: 2024-11-25

## 2024-11-25 RX ORDER — CEFAZOLIN 1 G/1
INJECTION, POWDER, FOR SOLUTION INTRAVENOUS AS NEEDED
Status: DISCONTINUED | OUTPATIENT
Start: 2024-11-25 | End: 2024-11-25

## 2024-11-25 RX ORDER — SODIUM CHLORIDE, SODIUM LACTATE, POTASSIUM CHLORIDE, CALCIUM CHLORIDE 600; 310; 30; 20 MG/100ML; MG/100ML; MG/100ML; MG/100ML
20 INJECTION, SOLUTION INTRAVENOUS CONTINUOUS
Status: DISCONTINUED | OUTPATIENT
Start: 2024-11-25 | End: 2024-11-25 | Stop reason: HOSPADM

## 2024-11-25 RX ORDER — HYDROMORPHONE HYDROCHLORIDE 0.2 MG/ML
0.2 INJECTION INTRAMUSCULAR; INTRAVENOUS; SUBCUTANEOUS EVERY 5 MIN PRN
Status: DISCONTINUED | OUTPATIENT
Start: 2024-11-25 | End: 2024-11-25 | Stop reason: HOSPADM

## 2024-11-25 RX ORDER — LABETALOL HYDROCHLORIDE 5 MG/ML
5 INJECTION, SOLUTION INTRAVENOUS ONCE AS NEEDED
Status: DISCONTINUED | OUTPATIENT
Start: 2024-11-25 | End: 2024-11-25 | Stop reason: HOSPADM

## 2024-11-25 RX ORDER — LIDOCAINE HYDROCHLORIDE 20 MG/ML
INJECTION, SOLUTION INFILTRATION; PERINEURAL AS NEEDED
Status: DISCONTINUED | OUTPATIENT
Start: 2024-11-25 | End: 2024-11-25

## 2024-11-25 RX ORDER — ALBUTEROL SULFATE 0.83 MG/ML
2.5 SOLUTION RESPIRATORY (INHALATION) ONCE AS NEEDED
Status: DISCONTINUED | OUTPATIENT
Start: 2024-11-25 | End: 2024-11-25 | Stop reason: HOSPADM

## 2024-11-25 RX ORDER — OXYBUTYNIN CHLORIDE 5 MG/1
5 TABLET ORAL 3 TIMES DAILY
Qty: 15 TABLET | Refills: 0 | Status: SHIPPED | OUTPATIENT
Start: 2024-11-25 | End: 2024-11-30

## 2024-11-25 RX ORDER — ROCURONIUM BROMIDE 10 MG/ML
INJECTION, SOLUTION INTRAVENOUS AS NEEDED
Status: DISCONTINUED | OUTPATIENT
Start: 2024-11-25 | End: 2024-11-25

## 2024-11-25 RX ORDER — PHENAZOPYRIDINE HYDROCHLORIDE 200 MG/1
200 TABLET, FILM COATED ORAL 3 TIMES DAILY
Qty: 9 TABLET | Refills: 0 | Status: SHIPPED | OUTPATIENT
Start: 2024-11-25 | End: 2024-11-28

## 2024-11-25 SDOH — HEALTH STABILITY: MENTAL HEALTH: CURRENT SMOKER: 0

## 2024-11-25 ASSESSMENT — PAIN SCALES - GENERAL
PAIN_LEVEL: 2
PAINLEVEL_OUTOF10: 0 - NO PAIN

## 2024-11-25 ASSESSMENT — PAIN - FUNCTIONAL ASSESSMENT
PAIN_FUNCTIONAL_ASSESSMENT: 0-10

## 2024-11-25 NOTE — ANESTHESIA POSTPROCEDURE EVALUATION
Patient: Cornell Larios    Procedure Summary       Date: 11/25/24 Room / Location: [facility] OR 04 / [facility] OR    Anesthesia Start: 1600 Anesthesia Stop: 1734    Procedure: Bilateral Retrogrades and Cystoscopy with Bladder Biopsy Diagnosis:       Malignant neoplasm of urinary bladder, unspecified site (Multi)      (Malignant neoplasm of urinary bladder, unspecified site (Multi) [C67.9])    Surgeons: Agapito Coyne MD MPH Responsible Provider: Gabriel Enrique MD    Anesthesia Type: general ASA Status: 3            Anesthesia Type: general    Vitals Value Taken Time   /76 11/25/24 1730   Temp 36.1 °C (97 °F) 11/25/24 1727   Pulse 65 11/25/24 1737   Resp 10 11/25/24 1737   SpO2 97 % 11/25/24 1737   Vitals shown include unfiled device data.    Anesthesia Post Evaluation    Patient location during evaluation: PACU  Patient participation: complete - patient participated  Level of consciousness: awake and alert  Pain score: 2  Pain management: adequate  Airway patency: patent  Cardiovascular status: acceptable  Respiratory status: acceptable  Hydration status: acceptable  Postoperative Nausea and Vomiting: none    There were no known notable events for this encounter.

## 2024-11-25 NOTE — ANESTHESIA PROCEDURE NOTES
Airway  Date/Time: 11/25/2024 4:15 PM  Urgency: elective    Airway not difficult    Staffing  Performed: RICH   Authorized by: Gabriel Enrique MD    Performed by: DIYA Salinas  Patient location during procedure: OR    Indications and Patient Condition  Indications for airway management: airway protection and anesthesia  Sedation level: deep  Preoxygenated: yes  Patient position: sniffing  Mask difficulty assessment: 1 - vent by mask    Final Airway Details  Final airway type: endotracheal airway      Successful airway: ETT  Cuffed: yes   Successful intubation technique: direct laryngoscopy  Endotracheal tube insertion site: oral  Blade: Amador  Blade size: #4  ETT size (mm): 7.5  Cormack-Lehane Classification: grade I - full view of glottis  Measured from: lips  Number of attempts at approach: 2  Ventilation between attempts: BVM    Additional Comments  LMA 5 placed, unable to seat well with multiple attempts.

## 2024-11-25 NOTE — ANESTHESIA PREPROCEDURE EVALUATION
Patient: Cornell Larios    Procedure Information       Date/Time: 24 1430    Procedure: Cystoscopy with Ablation Tissue    Location: [facility] OR 04 /  [facility] OR    Surgeons: Agapito Coyne MD MPH          54-year-old male here for cystoscopy with tissue ablation on 2024 for treatment of bladder cancer.       Medical History        Past Medical History:   Diagnosis Date    Bladder cancer (Multi)       bladder cancer diagnosed 2023    Hypertension             Clinical information reviewed:   Tobacco  Allergies  Meds   Med Hx  Surg Hx   Fam Hx  Soc Hx        NPO Detail:  NPO/Void Status  Date of Last Liquid: 24  Time of Last Liquid: 0600  Date of Last Solid: 24  Time of Last Solid: 1800  Last Intake Type: Clear fluids         Vitals:    24 1349   BP: 151/81   Pulse: 65   Resp: 18   Temp: 36.1 °C (97 °F)   SpO2: 96%       Past Surgical History:   Procedure Laterality Date    OTHER SURGICAL HISTORY      Bladder cancer removal     Past Medical History:   Diagnosis Date    Bladder cancer (Multi)     bladder cancer diagnosed 2023    Hypertension        Current Facility-Administered Medications:     ceFAZolin (Ancef) 2 g in dextrose (iso)  mL, 2 g, intravenous, Once, Agapito Coyne MD MPH    chlorhexidine (Hibiclens) 4 % liquid, , Topical, Daily PRN, Agapito Coyne MD MPH    lactated Ringer's infusion, 20 mL/hr, intravenous, Continuous, Agapito Coyne MD MPH  No Known Allergies  Social History     Tobacco Use    Smoking status: Former     Current packs/day: 0.00     Types: Cigarettes     Quit date: 2000     Years since quittin.9    Smokeless tobacco: Never   Substance Use Topics    Alcohol use: Yes     Alcohol/week: 1.0 standard drink of alcohol     Types: 1 Cans of beer per week         Chemistry    Lab Results   Component Value Date/Time     2024 1512    K 4.2 2024 1512     2024 1512    CO2 23 2024 1512    BUN  16 11/11/2024 1512    CREATININE 0.69 11/11/2024 1512    Lab Results   Component Value Date/Time    CALCIUM 9.4 11/11/2024 1512    ALKPHOS 27 (L) 07/22/2024 1450    AST 18 07/22/2024 1450    ALT 27 07/22/2024 1450    BILITOT 0.6 07/22/2024 1450          Lab Results   Component Value Date/Time    WBC 5.9 11/11/2024 1512    HGB 14.6 11/11/2024 1512    HCT 41.3 11/11/2024 1512     11/11/2024 1512     Lab Results   Component Value Date/Time    PROTIME 12.0 11/11/2024 1512    INR 1.1 11/11/2024 1512     Encounter Date: 07/26/24   ECG 12 lead   Result Value    Ventricular Rate 51    Atrial Rate 51    SD Interval 178    QRS Duration 100    QT Interval 428    QTC Calculation(Bazett) 394    P Axis 32    R Axis -6    T Axis -2    QRS Count 9    Q Onset 226    P Onset 137    P Offset 178    T Offset 440    QTC Fredericia 405    Narrative    Sinus bradycardia  Otherwise normal ECG  When compared with ECG of 13-JUN-2023 15:06,  Questionable change in QRS axis  T wave inversion now evident in Inferior leads  Confirmed by Marcel Hernandez ([id number]) on 7/31/2024 5:35:45 PM     2023 Stress test  Summary:  1. Nuclear image results are reported separately.  2. Excellent exercise duration.  3. No angina reported.  4. Normal blood pressure response to exercise with reduced heart rate response to exercise.  5. Peak heart rate 134 (86% of predicted).  6. No ST segment shifts of ischemia.  7. Normal treadmill exercise stress test.        NPO Detail:  NPO/Void Status  Date of Last Liquid: 11/25/24  Time of Last Liquid: 0600  Date of Last Solid: 11/24/24  Time of Last Solid: 1800  Last Intake Type: Clear fluids         Review of Systems    Physical Exam    Airway  Mallampati: II  TM distance: >3 FB     Cardiovascular   Rhythm: regular     Dental        Pulmonary   Breath sounds clear to auscultation     Abdominal   (+) obese             Anesthesia Plan    History of general anesthesia?: yes  History of complications of general  anesthesia?: no    ASA 3     general     The patient is not a current smoker.    intravenous induction   Anesthetic plan and risks discussed with patient and spouse.  Use of blood products discussed with patient and spouse who consented to blood products.    Plan discussed with CRNA, attending and CAA.

## 2024-11-25 NOTE — OP NOTE
Bilateral Retrogrades and Cystoscopy with Bladder Biopsy Operative Note     Date: 2024  OR Location: [facility] OR    Name: Cornell Larios, : 1970, Age: 54 y.o., MRN: [id number], Sex: male    Diagnosis  Pre-op Diagnosis      * Malignant neoplasm of urinary bladder, unspecified site (Multi) [C67.9] Post-op Diagnosis     * Malignant neoplasm of urinary bladder, unspecified site (Multi) [C67.9]     Procedures  Bilateral Retrogrades and Cystoscopy with Bladder Biopsy  40782 - SD CYSTOURETHROSCOPY W/DEST &/RMVL MED BLADDER JARED      Surgeons      * Agapito Coyne - Primary    Resident/Fellow/Other Assistant:  Surgeons and Role:     * Juan Carlos Howe MD - Resident - Assisting     * Joaquina Brand MD - Resident - Assisting    Staff:   Circulator: Jody  Circulator: Yumiko Marte Person: Kyrie    Anesthesia Staff: Anesthesiologist: Gabriel Enrique MD  C-AA: DIYA Salinas  RICH: Lindsey Huerta    Procedure Summary  Anesthesia: General  ASA: III  Estimated Blood Loss: 20mL  Intra-op Medications: Administrations occurring from 1430 to 1555 on 24:  * No intraprocedure medications in log *           Anesthesia Record               Intraprocedure I/O Totals          Intake    LR bolus 750.00 mL    Total Intake 750 mL          Specimen:   ID Type Source Tests Collected by Time   1 : RIGHT LATERAL Tissue BLADDER TRANSURETHRAL RESECTION SURGICAL PATHOLOGY EXAM Agapito Coyne MD MPH 2024 1645   2 : LEFT LATERAL Tissue BLADDER TRANSURETHRAL RESECTION SURGICAL PATHOLOGY EXAM Agapito Coyne MD MPH 2024 1647   3 : POSTERIOR WALL Tissue BLADDER TRANSURETHRAL RESECTION SURGICAL PATHOLOGY EXAM Agapito Coyne MD MPH 2024 1650   4 : PROSTHETIC URETHRA Tissue BLADDER TRANSURETHRAL RESECTION SURGICAL PATHOLOGY EXAM Agapito Coyne MD MPH 2024 1701   5 : DOME Tissue BLADDER TRANSURETHRAL RESECTION SURGICAL PATHOLOGY EXAM Agapito Coyne MD MPH 2024 1701   6 : Left Cytology Non-Gynecologic  Cytology URINE CATHETERIZED CYTOLOGY CONSULTATION (NON-GYNECOLOGIC) Agapito Coyne MD MPH 11/25/2024 1705   7 : Right Cytology Non-Gynecologic Cytology URINE CATHETERIZED CYTOLOGY CONSULTATION (NON-GYNECOLOGIC) Agapito Coyne MD MPH 11/25/2024 1706           Drains and/or Catheters: * None in log *      Findings: Negative white light and blue light cystoscopy. Negative bilateral retrograde pyelogram. Bilateral selective cytology. Random bladder biopsies (2x from R lat, L lat, post, dome and 3x from prostatic urethra). Bimanual exam with no masses and mobile bladder.    Indications: Cornell Larios is an 54M with bladder CIS dx 6/2023 s/p BCG induction with recurrence (Ta HG) 10/2023 s/p intravesical Mitomycin C who had negative biopsy 12/2023 but positive FISH 2/2024 and CTU w dilated distal R ureter who is here for cystoscopy, bilateral selective cytology, bilateral retrograde pyelogram, blue light cystoscopy and random bladder biopsy with prostatic urethral biopsy.      Procedure Details:   Patient was consented in the preoperative area. Questions were answered. A page catheter was placed in pre op and Cysview was administered through the catheter and catheter was clamped for an hour before the procedure.  Pt was brought to the operating room and placed in supine position on the OR table. Timeout was performed. All were in agreement. Preoperative antibiotics were administered. Patient underwent anesthesia without complication. Patient was repositioned in dorsal lithotomy position then prepped and draped in the usual sterile fashion. A 22 Welsh rigid cystoscope was placed into the bladder and bladder was emptied multiple times of the Cysview. The procedure began with a cystoscopic  examination of the entire bladder under white light and then a repeated examination under blue light without any abnormalities. Previous resection scar and bladder neck bleeding were mildly red on blue light. UOs were noted in  normal orthotopic position.  Through the right UO, a dual lumen catheter was placed over a sensorwire and urine was collected for selective cytology. Retrograde pyelogram was performed. This was repeated on the left side.     Retrograde Pyelograms   Right: Dilated distal ureter without filling defects or strictures. On complete opacification of the renal pelvis and calyces no filling defects were noted.   Left: No filling defects or strictures noted in the ureter. On complete opacification of the renal pelvis and calyces no filling defects were noted. Mild pyelovenous backflow.    Finished the procedure with random bladder biopsies. Using the rigid biopsy forceps, random bladder and prostatic urethral biopsies were sampled (right lateral wall, left lateral wall, posterior, dome, and anterior) and sent for pathology.  The biopsy sites were then fulgurated in their entirety.  Hemostasis at the conclusion of the fulguration was excellent.    Bladder was drained. Cystourethroscopy was repeated to confirm adequate hemostasis. Patient was awoken from anesthesia and transferred to PACU in stable condition. They will follow up to discuss pathology.       Complications:  None; patient tolerated the procedure well.    Disposition: PACU - hemodynamically stable.  Condition: stable       Attending Attestation: I was present for the entire procedure.    Agapito Coyne  Phone Number: [phone]

## 2024-11-25 NOTE — DISCHARGE INSTRUCTIONS
Post-Operative Instructions:  Bladder Biopsy    Medications  Tylenol and/or Motrin may be given to help with generalized discomfort after surgery.   Pyridium may be taken to help if you have burning with urination - this may turn your urine orange.  You may have been prescribed additional narcotics, such as Tramadol, for pain. Use these sparingly as they can cause confusion, fatigue, nausea, and constipation. Take a stool softener to help avoid constipation.   Keep taking any medications that you have been on for urination.   If you are on blood thinners - usually these can be restarted in 2-3 days if your urine is clear or pink, unless the doctor tells you otherwise. You may continue taking Aspirin immediately.      Activity  You may resume your normal diet. You should stick to fluids and bland foods at first, as you may be nauseous after surgery.   Make sure to drink plenty of water and stay hydrated  You may shower immediately after surgery.  Avoid constipation, heavy lifting, and straining your abdomen - these can cause blood in the urine. There are no restrictions on walking, stairs, etc.     Things to expect after surgery  Blood in urine/catheter if present, passage of small clots, and spotting in your underwear.   The urge to urinate frequently or difficulty with urination.  Burning/stinging/pain with urination is normal and can last for several days after surgery.  If you have a catheter:  You may have lower abdominal spasms or feeling like you need to urinate.  Make sure to change to the larger bag at night time if you have a day time leg bag.  If your catheter stops draining urine and you feel your bladder getting full, call the office immediately.    When to Call the Surgeon:  Fever higher than 102?F  Repeated vomiting with inability to keep down fluids  Severe pain not  controlled with medication  Inability to urinate for more than 8 hours or if catheter stops draining (if one is present)  For questions or problems, call our office at [phone]  Evenings and weekends: call the hospital  [phone] and ask for the [id number].  In case of emergency, call [phone].    Follow-Up Appointment:  You will be contacted to change your follow-up visit with Dr. Coyne to a different day or time.   Future Appointments   Date Time Provider Department Center   12/20/2024  8:20 AM Agapito Coyne MD MPH [id number]

## 2024-12-16 LAB
LAB AP ASR DISCLAIMER: NORMAL
LABORATORY COMMENT REPORT: NORMAL
PATH REPORT.FINAL DX SPEC: NORMAL
PATH REPORT.FINAL DX SPEC: NORMAL
PATH REPORT.GROSS SPEC: NORMAL
PATH REPORT.GROSS SPEC: NORMAL
PATH REPORT.RELEVANT HX SPEC: NORMAL
PATH REPORT.RELEVANT HX SPEC: NORMAL
PATH REPORT.TOTAL CANCER: NORMAL
PATH REPORT.TOTAL CANCER: NORMAL
RESIDENT REVIEW: NORMAL

## 2024-12-20 ENCOUNTER — APPOINTMENT (OUTPATIENT)
Dept: UROLOGY | Facility: CLINIC | Age: 54
End: 2024-12-20
Payer: COMMERCIAL

## 2024-12-27 ENCOUNTER — APPOINTMENT (OUTPATIENT)
Dept: UROLOGY | Facility: CLINIC | Age: 54
End: 2024-12-27
Payer: COMMERCIAL

## 2024-12-27 VITALS
DIASTOLIC BLOOD PRESSURE: 92 MMHG | HEART RATE: 61 BPM | HEIGHT: 70 IN | WEIGHT: 256 LBS | SYSTOLIC BLOOD PRESSURE: 169 MMHG | BODY MASS INDEX: 36.65 KG/M2

## 2024-12-27 DIAGNOSIS — C66.2 URETERAL CANCER, LEFT (MULTI): Primary | ICD-10-CM

## 2024-12-27 PROCEDURE — 3008F BODY MASS INDEX DOCD: CPT | Performed by: STUDENT IN AN ORGANIZED HEALTH CARE EDUCATION/TRAINING PROGRAM

## 2024-12-27 PROCEDURE — 99214 OFFICE O/P EST MOD 30 MIN: CPT | Performed by: STUDENT IN AN ORGANIZED HEALTH CARE EDUCATION/TRAINING PROGRAM

## 2024-12-27 RX ORDER — CEFAZOLIN SODIUM 2 G/100ML
2 INJECTION, SOLUTION INTRAVENOUS ONCE
OUTPATIENT
Start: 2024-12-27 | End: 2024-12-27

## 2024-12-27 RX ORDER — CHLORHEXIDINE GLUCONATE 40 MG/ML
SOLUTION TOPICAL DAILY PRN
OUTPATIENT
Start: 2024-12-27

## 2024-12-27 NOTE — PROGRESS NOTES
"Subjective    Cornell Larios is a 54 y.o. male s/p bilateral retrogrades and cystoscopy with bladder biopsy on 11/25/24 who presents for POV.    Pathology showed no evidence of cancer or carcinoma in situ on biopsies. Cytology showed urine cells we obtained from the left ureter were positive for urothelial carcinoma.       He has a history of NMIBC.  He has failed BCG in the past per reports.       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 Right distal ureter abnormality on imaging.  URS negative  Imaging date: 2/2024  Last cysto: 7/2024, negative       Review of Systems    All systems were reviewed. Anything negative was noted in the HPI.    Objective   Physical Exam  Gen: No acute distress      Psych: Alert and oriented x3      Neuro:  Normal ROM     Resp: Nonlabored respirations      CV: Regular rate and rhythm      Abd: S, NT, ND.     : Deferred     Skin: Warm, dry and intact without rashes      Lymphatics: No peripheral edema           Past Medical History:   Diagnosis Date    Bladder cancer (Multi)     bladder cancer diagnosed 6/2023    Hypertension          Past Surgical History:   Procedure Laterality Date    OTHER SURGICAL HISTORY  2023    Bladder cancer removal       FINAL DIAGNOSIS   A. DESIGNATED \"RIGHT LATERAL\", BIOPSY:  -- UROTHELIAL MUCOSA WITH ATYPIA (SEE NOTE).  -- MUSCULARIS PROPRIA IS NOT IDENTIFIED.     NOTE: Sections show urothelial mucosa with prominent atypia that does not meet the diagnostic threshold for carcinoma in situ.     B. DESIGNATED \"LEFT LATERAL\", BIOPSY:  -- UROTHELIAL MUCOSA WITH REACTIVE " "ATYPIA AND CHRONIC INFLAMMATION.  -- MUSCULARIS PROPRIA IS NOT IDENTIFIED.     C. DESIGNATED \"POSTERIOR WALL\", BIOPSY:  -- UROTHELIAL MUCOSA WITH REACTIVE ATYPIA AND CHRONIC INFLAMMATION.  -- MUSCULARIS PROPRIA IS NOT IDENTIFIED.     D. DESIGNATED \"PROSTHETIC URETHRA\", BIOPSY:  -- URETHRAL MUCOSA WITH GRANULOMATOUS INFLAMMATION AND FOREIGN BODY GIANT CELLS, COMPATIBLE WITH BCG THERAPY OR PROCEDURAL CHANGES (SEE NOTE).     NOTE: Immunohistochemical stain for CD68 highlights frequent histiocytes within the inflammatory infiltrate.     E. DESIGNATED \"DOME\", BIOPSY:  -- UROTHELIAL MUCOSA WITH REACTIVE ATYPIA AND CHRONIC INFLAMMATION.  -- MUSCULARIS PROPRIA IS NOT IDENTIFIED.     : Gertrude Dye MD     Assessment & Plan  Ureteral cancer, left (Multi)  We reviewed his pathology and discussed it in detail.  Pathology showed no evidence of cancer or carcinoma in situ on biopsies. Cytology showed urine cells we obtained from the left ureter were positive for urothelial carcinoma.       PLAN:  [place]oscopy, cytology, [place]GP[place] [facility] [place]  [place] surveillance the bladder at that time too.                                      Scribe Attestation  By signing my name below, I, Bassam Crespo   attest that this documentation has been prepared under the direction and in the presence of Agapito Coyne MD MPH    "

## 2024-12-27 NOTE — ASSESSMENT & PLAN NOTE
We reviewed his pathology and discussed it in detail.  Pathology showed no evidence of cancer or carcinoma in situ on biopsies. Cytology showed urine cells we obtained from the left ureter were positive for urothelial carcinoma.       PLAN:  Left ureteroscopy, cytology, RGP, stent placement and biopsy at [facility] [place].  Will surveillance the bladder at that time too.

## 2025-02-12 ENCOUNTER — PRE-ADMISSION TESTING (OUTPATIENT)
Dept: PREADMISSION TESTING | Facility: HOSPITAL | Age: 55
End: 2025-02-12
Payer: COMMERCIAL

## 2025-02-12 ENCOUNTER — LAB (OUTPATIENT)
Dept: LAB | Facility: HOSPITAL | Age: 55
End: 2025-02-12
Payer: COMMERCIAL

## 2025-02-12 VITALS
OXYGEN SATURATION: 96 % | DIASTOLIC BLOOD PRESSURE: 76 MMHG | HEIGHT: 70 IN | HEART RATE: 60 BPM | TEMPERATURE: 98.6 F | SYSTOLIC BLOOD PRESSURE: 125 MMHG | WEIGHT: 249.5 LBS | BODY MASS INDEX: 35.72 KG/M2 | RESPIRATION RATE: 14 BRPM

## 2025-02-12 DIAGNOSIS — C66.2 URETERAL CANCER, LEFT (MULTI): ICD-10-CM

## 2025-02-12 DIAGNOSIS — Z01.818 PREOP TESTING: Primary | ICD-10-CM

## 2025-02-12 DIAGNOSIS — C66.2 MALIGNANT NEOPLASM OF LEFT URETER (MULTI): Primary | ICD-10-CM

## 2025-02-12 LAB
ANION GAP SERPL CALC-SCNC: 12 MMOL/L (ref 10–20)
BUN SERPL-MCNC: 19 MG/DL (ref 6–23)
CALCIUM SERPL-MCNC: 9.5 MG/DL (ref 8.6–10.3)
CHLORIDE SERPL-SCNC: 104 MMOL/L (ref 98–107)
CO2 SERPL-SCNC: 25 MMOL/L (ref 21–32)
CREAT SERPL-MCNC: 0.82 MG/DL (ref 0.5–1.3)
EGFRCR SERPLBLD CKD-EPI 2021: >90 ML/MIN/1.73M*2
ERYTHROCYTE [DISTWIDTH] IN BLOOD BY AUTOMATED COUNT: 11.7 % (ref 11.5–14.5)
GLUCOSE SERPL-MCNC: 102 MG/DL (ref 74–99)
HCT VFR BLD AUTO: 41.1 % (ref 41–52)
HGB BLD-MCNC: 14.2 G/DL (ref 13.5–17.5)
MCH RBC QN AUTO: 31 PG (ref 26–34)
MCHC RBC AUTO-ENTMCNC: 34.5 G/DL (ref 32–36)
MCV RBC AUTO: 90 FL (ref 80–100)
NRBC BLD-RTO: NORMAL /100{WBCS}
PLATELET # BLD AUTO: 235 X10*3/UL (ref 150–450)
POTASSIUM SERPL-SCNC: 4.3 MMOL/L (ref 3.5–5.3)
RBC # BLD AUTO: 4.58 X10*6/UL (ref 4.5–5.9)
SODIUM SERPL-SCNC: 137 MMOL/L (ref 136–145)
WBC # BLD AUTO: 5.7 X10*3/UL (ref 4.4–11.3)

## 2025-02-12 PROCEDURE — 85027 COMPLETE CBC AUTOMATED: CPT

## 2025-02-12 PROCEDURE — 99203 OFFICE O/P NEW LOW 30 MIN: CPT | Performed by: NURSE PRACTITIONER

## 2025-02-12 PROCEDURE — 80048 BASIC METABOLIC PNL TOTAL CA: CPT

## 2025-02-12 PROCEDURE — 93005 ELECTROCARDIOGRAM TRACING: CPT

## 2025-02-12 ASSESSMENT — DUKE ACTIVITY SCORE INDEX (DASI)
CAN YOU TAKE CARE OF YOURSELF (EAT, DRESS, BATHE, OR USE TOILET): YES
CAN YOU WALK INDOORS, SUCH AS AROUND YOUR HOUSE: YES
CAN YOU RUN A SHORT DISTANCE: YES
TOTAL_SCORE: 58.2
CAN YOU DO LIGHT WORK AROUND THE HOUSE LIKE DUSTING OR WASHING DISHES: YES
CAN YOU DO YARD WORK LIKE RAKING LEAVES, WEEDING OR PUSHING A MOWER: YES
CAN YOU DO HEAVY WORK AROUND THE HOUSE LIKE SCRUBBING FLOORS OR LIFTING AND MOVING HEAVY FURNITURE: YES
CAN YOU WALK A BLOCK OR TWO ON LEVEL GROUND: YES
CAN YOU CLIMB A FLIGHT OF STAIRS OR WALK UP A HILL: YES
DASI METS SCORE: 9.9
CAN YOU DO MODERATE WORK AROUND THE HOUSE LIKE VACUUMING, SWEEPING FLOORS OR CARRYING GROCERIES: YES
CAN YOU HAVE SEXUAL RELATIONS: YES
CAN YOU PARTICIPATE IN STRENOUS SPORTS LIKE SWIMMING, SINGLES TENNIS, FOOTBALL, BASKETBALL, OR SKIING: YES
CAN YOU PARTICIPATE IN MODERATE RECREATIONAL ACTIVITIES LIKE GOLF, BOWLING, DANCING, DOUBLES TENNIS OR THROWING A BASEBALL OR FOOTBALL: YES

## 2025-02-12 ASSESSMENT — PAIN - FUNCTIONAL ASSESSMENT: PAIN_FUNCTIONAL_ASSESSMENT: 0-10

## 2025-02-12 ASSESSMENT — PAIN SCALES - GENERAL: PAINLEVEL_OUTOF10: 0 - NO PAIN

## 2025-02-12 NOTE — CPM/PAT H&P
CPM/PAT Evaluation       Name: Cornell Larios (Cornell Larios)  /Age: 1970/55 y.o.     In-Person       Chief Complaint: Ureteral cancer, left (Multi)     HPI  Patient is an alert and oriented 55 year old male scheduled for a  BIOPSY, URETER  on 25 with Dr. Coyne for Ureteral cancer, left (Multi). PMHX includes HTN. Presents to [facility] [place] PAT today for perioperative risk stratification and optimization.     Past Medical History:   Diagnosis Date    Bladder cancer (Multi)     bladder cancer diagnosed 2023    Hypertension        Past Surgical History:   Procedure Laterality Date    OTHER SURGICAL HISTORY      Bladder cancer removal       Patient  has no history on file for sexual activity.    Family History   Problem Relation Name Age of Onset    Stroke Mother      Stroke Father         No Known Allergies    Prior to Admission medications    Medication Sig Start Date End Date Taking? Authorizing Provider   losartan (Cozaar) 50 mg tablet Take 2 tablets (100 mg) by mouth once daily.    Historical Provider, MD   naproxen (EC Naprosyn) 500 mg EC tablet Take 1 tablet (500 mg) by mouth once daily as needed for mild pain (1 - 3). Do not crush, chew, or split.    Historical Provider, MD         Review of Systems   Constitutional: Negative for chills, decreased appetite, diaphoresis, fever and malaise/fatigue.   Eyes:  Negative for blurred vision and double vision.   Cardiovascular:  Negative for chest pain, claudication, cyanosis, dyspnea on exertion, irregular heartbeat, leg swelling, near-syncope and palpitations.   Respiratory:  Negative for cough, hemoptysis, shortness of breath and wheezing.    Endocrine: Negative for cold intolerance, heat intolerance, polydipsia, polyphagia and polyuria.   Gastrointestinal:  Negative for abdominal pain, constipation, diarrhea, dysphagia, nausea and vomiting.   Genitourinary:  Negative for bladder incontinence, dysuria, hematuria, incomplete emptying, nocturia,  frequency, pelvic pain and urgency.   Neurological:  Negative for headaches, light-headedness, paresthesias, sensory change and weakness.   Psychiatric/Behavioral:  Negative for altered mental status.    Musculoskeletal: Negative for myalgias, arthralgias     Vitals and nursing note reviewed.     Physical exam  Constitutional:       Appearance: Normal appearance. He is Obese.   HENT:      Head: Normocephalic and atraumatic.      Mouth/Throat:      Mouth: Mucous membranes are moist.      Pharynx: Oropharynx is clear.   Eyes:      Extraocular Movements: Extraocular movements intact.      Conjunctiva/sclera: Conjunctivae normal.      Pupils: Pupils are equal, round, and reactive to light.   Cardiovascular:      PMI at left midclavicular line. Normal rate. Regular rhythm. Normal S1. Normal S2.       Murmurs: There is no murmur.      No gallop.  No click. No rub.       No audible carotid bruit     No lower extremity edema on exam  Pulmonary:      Effort: Pulmonary effort is normal.      Breath sounds: Normal breath sounds.   Abdominal:      General: Abdomen is flat. Bowel sounds are normal.      Palpations: Abdomen is soft and non-tender  Musculoskeletal:      Cervical back: Normal range of motion and neck supple.   Skin:     General: Skin is warm and dry.      Capillary Refill: Capillary refill takes less than 2 seconds.   Neurological:      General: No focal deficit present.      Mental Status: He is alert and oriented to person, place, and time. Mental status is at baseline.   Psychiatric:         Mood and Affect: Mood normal.         Behavior: Behavior normal.         Thought Content: Thought content normal.         Judgment: Judgment normal.     Vitals and nursing note reviewed. Physical exam within normal limits.         DASI Risk Score      Flowsheet Row Pre-Admission Testing from 2/12/2025 in [facility] Pre-Admission Testing from 11/11/2024 in [facility]   Can you take care of  yourself (eat, dress, bathe, or use toilet)?  2.75 filed at 02/12/2025 1559 2.75 filed at 11/11/2024 1453   Can you walk indoors, such as around your house? 1.75 filed at 02/12/2025 1559 1.75 filed at 11/11/2024 1453   Can you walk a block or two on level ground?  2.75 filed at 02/12/2025 1559 2.75 filed at 11/11/2024 1453   Can you climb a flight of stairs or walk up a hill? 5.5 filed at 02/12/2025 1559 5.5 filed at 11/11/2024 1453   Can you run a short distance? 8 filed at 02/12/2025 1559 8 filed at 11/11/2024 1453   Can you do light work around the house like dusting or washing dishes? 2.7 filed at 02/12/2025 1559 2.7 filed at 11/11/2024 1453   Can you do moderate work around the house like vacuuming, sweeping floors or carrying groceries? 3.5 filed at 02/12/2025 1559 3.5 filed at 11/11/2024 1453   Can you do heavy work around the house like scrubbing floors or lifting and moving heavy furniture?  8 filed at 02/12/2025 1559 8 filed at 11/11/2024 1453   Can you do yard work like raking leaves, weeding or pushing a mower? 4.5 filed at 02/12/2025 1559 4.5 filed at 11/11/2024 1453   Can you have sexual relations? 5.25 filed at 02/12/2025 1559 5.25 filed at 11/11/2024 1453   Can you participate in moderate recreational activities like golf, bowling, dancing, doubles tennis or throwing a baseball or football? 6 filed at 02/12/2025 1559 6 filed at 11/11/2024 1453   Can you participate in strenous sports like swimming, singles tennis, football, basketball, or skiing? 7.5 filed at 02/12/2025 1559 7.5 filed at 11/11/2024 1453   DASI SCORE 58.2 filed at 02/12/2025 1559 58.2 filed at 11/11/2024 1453   METS Score (Will be calculated only when all the questions are answered) 9.9 filed at 02/12/2025 1559 9.9 filed at 11/11/2024 1453          Caprini DVT Assessment      Flowsheet Row Pre-Admission Testing from 2/12/2025 in [facility] Pre-Admission Testing from 11/11/2024 in [facility]   DVT  Score (IF A SCORE IS NOT CALCULATING, MUST SELECT A BMI TO COMPLETE) -- 6 filed at 11/11/2024 1505   Medical Factors Present cancer, chemotherapy, or previous malignancy filed at 02/12/2025 1555 Present cancer, chemotherapy, or previous malignancy filed at 11/11/2024 1505   Surgical Factors Major surgery planned, including arthroscopic and laproscopic (1-2 hours) filed at 02/12/2025 1555 Minor surgery planned filed at 11/11/2024 1505   BMI (BMI MUST BE CHOSEN) -- 31-40 (Obesity) filed at 11/11/2024 1505          Modified Frailty Index      Flowsheet Row Pre-Admission Testing from 11/11/2024 in [facility]   Non-independent functional status (problems with dressing, bathing, personal grooming, or cooking) 0 filed at 11/11/2024 1505   History of diabetes mellitus  0 filed at 11/11/2024 1505   History of COPD 0 filed at 11/11/2024 1505   History of CHF No filed at 11/11/2024 1505   History of MI 0 filed at 11/11/2024 1505   History of Percutaneous Coronary Intervention, Cardiac Surgery, or Angina No filed at 11/11/2024 1505   Hypertension requiring the use of medication  0.0909 filed at 11/11/2024 1505   Peripheral vascular disease 0 filed at 11/11/2024 1505   Impaired sensorium (cognitive impairement or loss, clouding, or delirium) 0 filed at 11/11/2024 1505   TIA or CVA withouy residual deficit 0 filed at 11/11/2024 1505   Cerebrovascular accident with deficit 0 filed at 11/11/2024 1505   Modified Frailty Index Calculator .0909 filed at 11/11/2024 1505          CHADS2 Stroke Risk  Current as of 59 minutes ago        N/A 3 to 100%: High Risk   2 to < 3%: Medium Risk   0 to < 2%: Low Risk     Last Change: N/A          This score determines the patient's risk of having a stroke if the patient has atrial fibrillation.        This score is not applicable to this patient. Components are not calculated.          Revised Cardiac Risk Index      Flowsheet Row Pre-Admission Testing from 2/12/2025 in   [facility] Pre-Admission Testing from 11/11/2024 in [facility]   High-Risk Surgery (Intraperitoneal, Intrathoracic,Suprainguinal vascular) 0 filed at 02/12/2025 1559 0 filed at 11/11/2024 1505   History of ischemic heart disease (History of MI, History of positive exercuse test, Current chest paint considered due to myocardial ischemia, Use of nitrate therapy, ECG with pathological Q Waves) 0 filed at 02/12/2025 1559 0 filed at 11/11/2024 1505   History of congestive heart failure (pulmonary edemia, bilateral rales or S3 gallop, Paroxysmal nocturnal dyspnea, CXR showing pulmonary vascular redistribution) 0 filed at 02/12/2025 1559 0 filed at 11/11/2024 1505   History of cerebrovascular disease (Prior TIA or stroke) 0 filed at 02/12/2025 1559 0 filed at 11/11/2024 1505   Pre-operative insulin treatment 0 filed at 02/12/2025 1559 0 filed at 11/11/2024 1505   Pre-operative creatinine>2 mg/dl 0 filed at 02/12/2025 1559 0 filed at 11/11/2024 1505   Revised Cardiac Risk Calculator 0 filed at 02/12/2025 1559 0 filed at 11/11/2024 1505          Apfel Simplified Score      Flowsheet Row Pre-Admission Testing from 11/11/2024 in [facility]   Smoking status 1 filed at 11/11/2024 1505   History of motion sickness or PONV  0 filed at 11/11/2024 1505   Use of postoperative opioids 1 filed at 11/11/2024 1505   Gender - Female 0=No filed at 11/11/2024 1505   Apfel Simplified Score Calculator 2 filed at 11/11/2024 1505          Risk Analysis Index Results This Encounter    No data found in the last 10 encounters.       Stop Bang Score      Flowsheet Row Pre-Admission Testing from 2/12/2025 in [facility] Pre-Admission Testing from 11/11/2024 in [facility]   Do you snore loudly? 0 filed at 02/12/2025 1527 0 filed at 11/11/2024 1452   Do you often feel tired or fatigued after your sleep? 0 filed at 02/12/2025 1527 0 filed at 11/11/2024 1452   Has anyone  ever observed you stop breathing in your sleep? 0 filed at 02/12/2025 1527 0 filed at 11/11/2024 1452   Do you have or are you being treated for high blood pressure? 1 filed at 02/12/2025 1527 1 filed at 11/11/2024 1452   Recent BMI (Calculated) 35.8 filed at 02/12/2025 1527 35.6 filed at 11/11/2024 1452   Is BMI greater than 35 kg/m2? 1=Yes filed at 02/12/2025 1527 1=Yes filed at 11/11/2024 1452   Age older than 50 years old? 1=Yes filed at 02/12/2025 1527 1=Yes filed at 11/11/2024 1452   Is your neck circumference greater than 17 inches (Male) or 16 inches (Female)? 1 filed at 02/12/2025 1527 1 filed at 11/11/2024 1452   Gender - Male 1=Yes filed at 02/12/2025 1527 1=Yes filed at 11/11/2024 1452   STOP-BANG Total Score 5 filed at 02/12/2025 1527 5 filed at 11/11/2024 1452          Prodigy: High Risk  Total Score: 8              Prodigy Gender Score          ARISCAT Score for Postoperative Pulmonary Complications      Flowsheet Row Pre-Admission Testing from 2/12/2025 in [facility] Pre-Admission Testing from 11/11/2024 in [facility]   Age Calculated Score 3 filed at 02/12/2025 1559 3 filed at 11/11/2024 1505   Preoperative SpO2 0 filed at 02/12/2025 1559 0 filed at 11/11/2024 1505   Respiratory infection in the last month Either upper or lower (i.e., URI, bronchitis, pneumonia), with fever and antibiotic treatment 0 filed at 02/12/2025 1559 0 filed at 11/11/2024 1505   Preoperative anemia (Hgb less than 10 g/dl) 0 filed at 02/12/2025 1559 0 filed at 11/11/2024 1505   Surgical incision  0 filed at 02/12/2025 1559 0 filed at 11/11/2024 1505   Duration of surgery  0 filed at 02/12/2025 1559 0 filed at 11/11/2024 1505   Emergency Procedure  0 filed at 02/12/2025 1559 0 filed at 11/11/2024 1505   ARISCAT Total Score  3 filed at 02/12/2025 1559 3 filed at 11/11/2024 1505          Shashank Perioperative Risk for Myocardial Infarction or Cardiac Arrest (FREDI)      Flowsheet Row Pre-Admission  Testing from 11/11/2024 in [facility]   Calculated Age Score 1.12 filed at 11/11/2024 1505   Functional Status  0 filed at 11/11/2024 1505   ASA Class  -3.29 filed at 11/11/2024 1505   Creatinine 0 filed at 11/11/2024 1505   Type of Procedure  -0.26 filed at 11/11/2024 1505   FREDI Total Score  -7.68 filed at 11/11/2024 1505   FREDI % 0.05 filed at 11/11/2024 1505              Assessment & Plan:    Neuro:  No diagnosis or significant findings on chart review or clinical presentation and evaluation.     HEENT/Airway:  No diagnosis or significant findings on chart review or clinical presentation and evaluation.   STOP-BANG Score- 5 points high risk for MADHU    Mallampati::  II    TM distance::  >3 FB    Neck ROM::  Full  Dentures-upper and lower   Crowns-denies  Implants-denies    Cardiovascular:  No diagnosis or significant findings on chart review or clinical presentation and evaluation.   METS: 9.9  RCRI: 0 points, 3.9%  risk for postoperative MACE   FREDI: 0.14% risk for postoperative MACE  EKG -normal sinus rhythm     Pulmonary:  No diagnosis or significant findings on chart review or clinical presentation and evaluation.   ARISCAT: <26 points, 1.6% risk of in-hospital postoperative pulmonary complication  PRODIGY: Low risk for opioid induced respiratory depression  Smoking History- quit smoking > 22 yrs ago   Deep breathing handout given    Renal/Urinary:  No diagnosis or significant findings on chart review or clinical presentation and evaluation, however, the patient is at increased risk of perioperative renal complications secondary to male sex. Preventative measures include BP monitoring, medication compliance, and hydration management.   CMP-Pending  Creatinine-  GFR-    Endocrine:  No diagnosis or significant findings on chart review or clinical presentation and evaluation.   [id number]    Hematologic/Immunology:  No diagnosis or significant findings on chart review or clinical presentation  and evaluation.  The patient is not a Jehovah’s witness and will accept blood and blood products if medically indicated.   History of previous blood transfusions No  CBC-Pending  HGB-Pending  Caprini Score 2, patient at Low for postoperative DVT. Pt supplied education/VTE handout  Anticoagulation use: No     Gastrointestinal:   No diagnosis or significant findings on chart review or clinical presentation and evaluation.   Recreational drug use: none  Alcohol use 1 beers per week    Infectious disease:   No diagnosis or significant findings on chart review or clinical presentation and evaluation.     Musculoskeletal:   No diagnosis or significant findings on chart review or clinical presentation and evaluation.   JHFRAT score- 2 points. low risk for falls    Anesthesia:  ASA 2 - Patient with mild systemic disease with no functional limitations  Anticipated anesthesia-general  History of General anesthesia- yes  Complications- No anesthesia complications  No family history of anesthesia complications    Abnormalities noted on PAT evaluation: No    Labs & Imaging ordered:  CBC, CMP, EKG  Nickel/metal allergy-negative  Shellfish allergy-negative    Discussed with patient medication instructions, NPO guidelines, and any questions or concerns.     time spent  35

## 2025-02-12 NOTE — H&P (VIEW-ONLY)
CPM/PAT Evaluation       Name: Cornell Larios (Cornell Larios)  /Age: 1970/55 y.o.     In-Person       Chief Complaint: Ureteral cancer, left (Multi)     HPI  Patient is an alert and oriented 55 year old male scheduled for a  BIOPSY, URETER  on 25 with Dr. Coyne for Ureteral cancer, left (Multi). PMHX includes HTN. Presents to [facility] today for perioperative risk stratification and optimization.     Past Medical History:   Diagnosis Date    Bladder cancer (Multi)     bladder cancer diagnosed 2023    Hypertension        Past Surgical History:   Procedure Laterality Date    OTHER SURGICAL HISTORY      Bladder cancer removal       Patient  has no history on file for sexual activity.    Family History   Problem Relation Name Age of Onset    Stroke Mother      Stroke Father         No Known Allergies    Prior to Admission medications    Medication Sig Start Date End Date Taking? Authorizing Provider   losartan (Cozaar) 50 mg tablet Take 2 tablets (100 mg) by mouth once daily.    Historical Provider, MD   naproxen (EC Naprosyn) 500 mg EC tablet Take 1 tablet (500 mg) by mouth once daily as needed for mild pain (1 - 3). Do not crush, chew, or split.    Historical Provider, MD         Review of Systems   Constitutional: Negative for chills, decreased appetite, diaphoresis, fever and malaise/fatigue.   Eyes:  Negative for blurred vision and double vision.   Cardiovascular:  Negative for chest pain, claudication, cyanosis, dyspnea on exertion, irregular heartbeat, leg swelling, near-syncope and palpitations.   Respiratory:  Negative for cough, hemoptysis, shortness of breath and wheezing.    Endocrine: Negative for cold intolerance, heat intolerance, polydipsia, polyphagia and polyuria.   Gastrointestinal:  Negative for abdominal pain, constipation, diarrhea, dysphagia, nausea and vomiting.   Genitourinary:  Negative for bladder incontinence, dysuria, hematuria, incomplete emptying, nocturia,  frequency, pelvic pain and urgency.   Neurological:  Negative for headaches, light-headedness, paresthesias, sensory change and weakness.   Psychiatric/Behavioral:  Negative for altered mental status.    Musculoskeletal: Negative for myalgias, arthralgias     Vitals and nursing note reviewed.     Physical exam  Constitutional:       Appearance: Normal appearance. He is Obese.   HENT:      Head: Normocephalic and atraumatic.      Mouth/Throat:      Mouth: Mucous membranes are moist.      Pharynx: Oropharynx is clear.   Eyes:      Extraocular Movements: Extraocular movements intact.      Conjunctiva/sclera: Conjunctivae normal.      Pupils: Pupils are equal, round, and reactive to light.   Cardiovascular:      PMI at left midclavicular line. Normal rate. Regular rhythm. Normal S1. Normal S2.       Murmurs: There is no murmur.      No gallop.  No click. No rub.       No audible carotid bruit     No lower extremity edema on exam  Pulmonary:      Effort: Pulmonary effort is normal.      Breath sounds: Normal breath sounds.   Abdominal:      General: Abdomen is flat. Bowel sounds are normal.      Palpations: Abdomen is soft and non-tender  Musculoskeletal:      Cervical back: Normal range of motion and neck supple.   Skin:     General: Skin is warm and dry.      Capillary Refill: Capillary refill takes less than 2 seconds.   Neurological:      General: No focal deficit present.      Mental Status: He is alert and oriented to person, place, and time. Mental status is at baseline.   Psychiatric:         Mood and Affect: Mood normal.         Behavior: Behavior normal.         Thought Content: Thought content normal.         Judgment: Judgment normal.     Vitals and nursing note reviewed. Physical exam within normal limits.         DASI Risk Score      Flowsheet Row Pre-Admission Testing from 2/12/2025 in [facility] Pre-Admission Testing from 11/11/2024 in [facility]   Can you take care of  yourself (eat, dress, bathe, or use toilet)?  2.75 filed at 02/12/2025 1559 2.75 filed at 11/11/2024 1453   Can you walk indoors, such as around your house? 1.75 filed at 02/12/2025 1559 1.75 filed at 11/11/2024 1453   Can you walk a block or two on level ground?  2.75 filed at 02/12/2025 1559 2.75 filed at 11/11/2024 1453   Can you climb a flight of stairs or walk up a hill? 5.5 filed at 02/12/2025 1559 5.5 filed at 11/11/2024 1453   Can you run a short distance? 8 filed at 02/12/2025 1559 8 filed at 11/11/2024 1453   Can you do light work around the house like dusting or washing dishes? 2.7 filed at 02/12/2025 1559 2.7 filed at 11/11/2024 1453   Can you do moderate work around the house like vacuuming, sweeping floors or carrying groceries? 3.5 filed at 02/12/2025 1559 3.5 filed at 11/11/2024 1453   Can you do heavy work around the house like scrubbing floors or lifting and moving heavy furniture?  8 filed at 02/12/2025 1559 8 filed at 11/11/2024 1453   Can you do yard work like raking leaves, weeding or pushing a mower? 4.5 filed at 02/12/2025 1559 4.5 filed at 11/11/2024 1453   Can you have sexual relations? 5.25 filed at 02/12/2025 1559 5.25 filed at 11/11/2024 1453   Can you participate in moderate recreational activities like golf, bowling, dancing, doubles tennis or throwing a baseball or football? 6 filed at 02/12/2025 1559 6 filed at 11/11/2024 1453   Can you participate in strenous sports like swimming, singles tennis, football, basketball, or skiing? 7.5 filed at 02/12/2025 1559 7.5 filed at 11/11/2024 1453   DASI SCORE 58.2 filed at 02/12/2025 1559 58.2 filed at 11/11/2024 1453   METS Score (Will be calculated only when all the questions are answered) 9.9 filed at 02/12/2025 1559 9.9 filed at 11/11/2024 1453          Caprini DVT Assessment      Flowsheet Row Pre-Admission Testing from 2/12/2025 in [facility] Pre-Admission Testing from 11/11/2024 in [facility]   DVT  Score (IF A SCORE IS NOT CALCULATING, MUST SELECT A BMI TO COMPLETE) -- 6 filed at 11/11/2024 1505   Medical Factors Present cancer, chemotherapy, or previous malignancy filed at 02/12/2025 1555 Present cancer, chemotherapy, or previous malignancy filed at 11/11/2024 1505   Surgical Factors Major surgery planned, including arthroscopic and laproscopic (1-2 hours) filed at 02/12/2025 1555 Minor surgery planned filed at 11/11/2024 1505   BMI (BMI MUST BE CHOSEN) -- 31-40 (Obesity) filed at 11/11/2024 1505          Modified Frailty Index      Flowsheet Row Pre-Admission Testing from 11/11/2024 in [facility]   Non-independent functional status (problems with dressing, bathing, personal grooming, or cooking) 0 filed at 11/11/2024 1505   History of diabetes mellitus  0 filed at 11/11/2024 1505   History of COPD 0 filed at 11/11/2024 1505   History of CHF No filed at 11/11/2024 1505   History of MI 0 filed at 11/11/2024 1505   History of Percutaneous Coronary Intervention, Cardiac Surgery, or Angina No filed at 11/11/2024 1505   Hypertension requiring the use of medication  0.0909 filed at 11/11/2024 1505   Peripheral vascular disease 0 filed at 11/11/2024 1505   Impaired sensorium (cognitive impairement or loss, clouding, or delirium) 0 filed at 11/11/2024 1505   TIA or CVA withouy residual deficit 0 filed at 11/11/2024 1505   Cerebrovascular accident with deficit 0 filed at 11/11/2024 1505   Modified Frailty Index Calculator .0909 filed at 11/11/2024 1505          CHADS2 Stroke Risk  Current as of 59 minutes ago        N/A 3 to 100%: High Risk   2 to < 3%: Medium Risk   0 to < 2%: Low Risk     Last Change: N/A          This score determines the patient's risk of having a stroke if the patient has atrial fibrillation.        This score is not applicable to this patient. Components are not calculated.          Revised Cardiac Risk Index      Flowsheet Row Pre-Admission Testing from 2/12/2025 in   [facility] Pre-Admission Testing from 11/11/2024 in [facility]   High-Risk Surgery (Intraperitoneal, Intrathoracic,Suprainguinal vascular) 0 filed at 02/12/2025 1559 0 filed at 11/11/2024 1505   History of ischemic heart disease (History of MI, History of positive exercuse test, Current chest paint considered due to myocardial ischemia, Use of nitrate therapy, ECG with pathological Q Waves) 0 filed at 02/12/2025 1559 0 filed at 11/11/2024 1505   History of congestive heart failure (pulmonary edemia, bilateral rales or S3 gallop, Paroxysmal nocturnal dyspnea, CXR showing pulmonary vascular redistribution) 0 filed at 02/12/2025 1559 0 filed at 11/11/2024 1505   History of cerebrovascular disease (Prior TIA or stroke) 0 filed at 02/12/2025 1559 0 filed at 11/11/2024 1505   Pre-operative insulin treatment 0 filed at 02/12/2025 1559 0 filed at 11/11/2024 1505   Pre-operative creatinine>2 mg/dl 0 filed at 02/12/2025 1559 0 filed at 11/11/2024 1505   Revised Cardiac Risk Calculator 0 filed at 02/12/2025 1559 0 filed at 11/11/2024 1505          Apfel Simplified Score      Flowsheet Row Pre-Admission Testing from 11/11/2024 in [facility]   Smoking status 1 filed at 11/11/2024 1505   History of motion sickness or PONV  0 filed at 11/11/2024 1505   Use of postoperative opioids 1 filed at 11/11/2024 1505   Gender - Female 0=No filed at 11/11/2024 1505   Apfel Simplified Score Calculator 2 filed at 11/11/2024 1505          Risk Analysis Index Results This Encounter    No data found in the last 10 encounters.       Stop Bang Score      Flowsheet Row Pre-Admission Testing from 2/12/2025 in [facility] Pre-Admission Testing from 11/11/2024 in [facility]   Do you snore loudly? 0 filed at 02/12/2025 1527 0 filed at 11/11/2024 1452   Do you often feel tired or fatigued after your sleep? 0 filed at 02/12/2025 1527 0 filed at 11/11/2024 1452   Has anyone  ever observed you stop breathing in your sleep? 0 filed at 02/12/2025 1527 0 filed at 11/11/2024 1452   Do you have or are you being treated for high blood pressure? 1 filed at 02/12/2025 1527 1 filed at 11/11/2024 1452   Recent BMI (Calculated) 35.8 filed at 02/12/2025 1527 35.6 filed at 11/11/2024 1452   Is BMI greater than 35 kg/m2? 1=Yes filed at 02/12/2025 1527 1=Yes filed at 11/11/2024 1452   Age older than 50 years old? 1=Yes filed at 02/12/2025 1527 1=Yes filed at 11/11/2024 1452   Is your neck circumference greater than 17 inches (Male) or 16 inches (Female)? 1 filed at 02/12/2025 1527 1 filed at 11/11/2024 1452   Gender - Male 1=Yes filed at 02/12/2025 1527 1=Yes filed at 11/11/2024 1452   STOP-BANG Total Score 5 filed at 02/12/2025 1527 5 filed at 11/11/2024 1452          Prodigy: High Risk  Total Score: 8              Prodigy Gender Score          ARISCAT Score for Postoperative Pulmonary Complications      Flowsheet Row Pre-Admission Testing from 2/12/2025 in [facility] Pre-Admission Testing from 11/11/2024 in [facility]   Age Calculated Score 3 filed at 02/12/2025 1559 3 filed at 11/11/2024 1505   Preoperative SpO2 0 filed at 02/12/2025 1559 0 filed at 11/11/2024 1505   Respiratory infection in the last month Either upper or lower (i.e., URI, bronchitis, pneumonia), with fever and antibiotic treatment 0 filed at 02/12/2025 1559 0 filed at 11/11/2024 1505   Preoperative anemia (Hgb less than 10 g/dl) 0 filed at 02/12/2025 1559 0 filed at 11/11/2024 1505   Surgical incision  0 filed at 02/12/2025 1559 0 filed at 11/11/2024 1505   Duration of surgery  0 filed at 02/12/2025 1559 0 filed at 11/11/2024 1505   Emergency Procedure  0 filed at 02/12/2025 1559 0 filed at 11/11/2024 1505   ARISCAT Total Score  3 filed at 02/12/2025 1559 3 filed at 11/11/2024 1505          Shashank Perioperative Risk for Myocardial Infarction or Cardiac Arrest (FREDI)      Flowsheet Row Pre-Admission  Testing from 11/11/2024 in [facility]   Calculated Age Score 1.12 filed at 11/11/2024 1505   Functional Status  0 filed at 11/11/2024 1505   ASA Class  -3.29 filed at 11/11/2024 1505   Creatinine 0 filed at 11/11/2024 1505   Type of Procedure  -0.26 filed at 11/11/2024 1505   FREDI Total Score  -7.68 filed at 11/11/2024 1505   FREDI % 0.05 filed at 11/11/2024 1505              Assessment & Plan:    Neuro:  No diagnosis or significant findings on chart review or clinical presentation and evaluation.     HEENT/Airway:  No diagnosis or significant findings on chart review or clinical presentation and evaluation.   STOP-BANG Score- 5 points high risk for MADHU    Mallampati::  II    TM distance::  >3 FB    Neck ROM::  Full  Dentures-upper and lower   Crowns-denies  Implants-denies    Cardiovascular:  No diagnosis or significant findings on chart review or clinical presentation and evaluation.   METS: 9.9  RCRI: 0 points, 3.9%  risk for postoperative MACE   FREDI: 0.14% risk for postoperative MACE  EKG -normal sinus rhythm     Pulmonary:  No diagnosis or significant findings on chart review or clinical presentation and evaluation.   ARISCAT: <26 points, 1.6% risk of in-hospital postoperative pulmonary complication  PRODIGY: Low risk for opioid induced respiratory depression  Smoking History- quit smoking > 22 yrs ago   Deep breathing handout given    Renal/Urinary:  No diagnosis or significant findings on chart review or clinical presentation and evaluation, however, the patient is at increased risk of perioperative renal complications secondary to male sex. Preventative measures include BP monitoring, medication compliance, and hydration management.   CMP-Pending  Creatinine-  GFR-    Endocrine:  No diagnosis or significant findings on chart review or clinical presentation and evaluation.   [id number]    Hematologic/Immunology:  No diagnosis or significant findings on chart review or clinical presentation  and evaluation.  The patient is not a Jehovah’s witness and will accept blood and blood products if medically indicated.   History of previous blood transfusions No  CBC-Pending  HGB-Pending  Caprini Score 2, patient at Low for postoperative DVT. Pt supplied education/VTE handout  Anticoagulation use: No     Gastrointestinal:   No diagnosis or significant findings on chart review or clinical presentation and evaluation.   Recreational drug use: none  Alcohol use 1 beers per week    Infectious disease:   No diagnosis or significant findings on chart review or clinical presentation and evaluation.     Musculoskeletal:   No diagnosis or significant findings on chart review or clinical presentation and evaluation.   JHFRAT score- 2 points. low risk for falls    Anesthesia:  ASA 2 - Patient with mild systemic disease with no functional limitations  Anticipated anesthesia-general  History of General anesthesia- yes  Complications- No anesthesia complications  No family history of anesthesia complications    Abnormalities noted on PAT evaluation: No    Labs & Imaging ordered:  CBC, CMP, EKG  Nickel/metal allergy-negative  Shellfish allergy-negative    Discussed with patient medication instructions, NPO guidelines, and any questions or concerns.     time spent  35

## 2025-02-12 NOTE — PREPROCEDURE INSTRUCTIONS
Medication List            Accurate as of February 12, 2025  3:36 PM. Always use your most recent med list.                losartan 50 mg tablet  Commonly known as: Cozaar  Medication Adjustments for Surgery: Take last dose 1 day (24 hours) before surgery  Notes to patient: Last dose preoperatively if taken in the mornings on 2/24/25 if taken in the evenings on 2/23/25     naproxen 500 mg EC tablet  Commonly known as: EC Naprosyn  Additional Medication Adjustments for Surgery: Take last dose 7 days before surgery  Notes to patient: Last dose preoperatively on 2/17/25            NPO Instructions:     Do not eat any food after midnight the night before your surgery/procedure.  You may have clear liquids until TWO hours before surgery/procedure. This includes water, black tea/coffee, (no milk or cream) apple juice and electrolyte drinks (Gatorade).  You may chew gum up to TWO hours before your surgery/procedure.     Additional Instructions:      Seven/Six Days before Surgery:  Review your medication instructions, stop indicated medications  Five Days before Surgery:  Review your medication instructions, stop indicated medications  Three Days before Surgery:  Review your medication instructions, stop indicated medications  The Day before Surgery:  No smoking or alcohol use 24 hours before surgery  Review your medication instructions, stop indicated medications  You will be contacted regarding the time of your arrival to facility and surgery time  Do not eat any food after Midnight  Day of Surgery:  Review your medication instructions, take indicated medications  If you have diabetes, please check your fasting blood sugar upon awakening.  If fasting blood sugar is <80 mg/dl, drink 100 ml of apple juice, time limit of 2 hours before  You may have clear liquids until TWO hours before surgery/procedure.  This includes water, black tea/coffee, (no milk or cream) apple juice and electrolyte drinks (Gatorade)  You may chew  gum up to TWO hours before your surgery/procedure  Wear  comfortable loose fitting clothing  Do not use moisturizers, creams, lotions or perfume  All jewelry and valuables should be left at home     CONTACT SURGEON'S OFFICE IF YOU DEVELOP:  * Fever = 100.4 F   * New respiratory symptoms (e.g. cough, shortness of breath, respiratory distress, sore throat)  * Recent loss of taste or smell  *Flu like symptoms such as headache, fatigue or gastrointestinal symptoms  * You develop any open sores, shingles, burning or painful urination   AND/OR:  * You no longer wish to have the surgery.  * Any other personal circumstances change that may lead to the need to cancel or defer this surgery.  *You were admitted to any hospital within one week of your planned procedure.     SMOKING:  *Quitting smoking can make a huge difference to your health and recovery from surgery.    *If you need help with quitting, call 7-818-QUIT-NOW.     THE DAY BEFORE SURGERY:  *Do not eat any food after midnight the night before your surgery.   *You may have up to TEN OUNCES of clear liquids until TWO hours before your instructed ARRIVAL TIME to hospital. This includes water, black tea/coffee, (no milk or cream) apple juice, clear broth and electrolyte drinks (Gatorade). Please avoid clear liquids that are red in color.   *You may chew gum/mints up to TWO hours before your surgery/procedure.     SURGICAL TIME:  *You will be contacted between 2 p.m. and 3 p.m. the business day before your surgery with your arrival time.  *If you haven't received a call by 3pm, call [phone]  *Scheduled surgery times may change and you will be notified if this occurs-check your personal voicemail for any updates.     ON THE MORNING OF SURGERY:  *Wear comfortable, loose fitting clothing.   *Do not use moisturizers, creams, lotions or perfume.  *All jewelry and valuables should be left at home.  *Prosthetic devices such as contact lenses, hearing aids, dentures,  eyelash extensions, hairpins and body piercing must be removed before surgery.     BRING WITH YOU:  *Photo ID and insurance card  *Current list of medications and allergies  *Pacemaker/Defibrillator/Heart stent cards  *CPAP machine and mask  *Slings/splints/crutches  *Copy of your complete Advanced Directive/DHPOA-if applicable  *Neurostimulator implant remote     PARKING AND ARRIVAL:  *Check in at the Main Entrance desk and let them know you are here for surgery.     IF YOU ARE HAVING OUTPATIENT/SAME DAY SURGERY:  *A responsible adult MUST accompany you at the time of discharge and stay with you for 24 hours after your surgery.  *You may NOT drive yourself home after surgery.  *You may use a taxi or ride sharing service ([employer], Uber) to return home ONLY if you are accompanied by a friend or family member.  *Instructions for resuming your medications will be provided by your surgeon.     Thank you for coming to Pre Admission testing.      If I have prescribed medication please don't forget to  at your pharmacy.      Any questions about today's visit call [phone] and leave a message in the general mailbox.     Patient instructed to ambulate as soon as possible postoperatively to decrease thromboembolic risk.     Madhavi Barrett APRN-CNP     Thank you for visiting [facility].  If you have any changes to your health condition, please call the surgeons office to alert them and give them details of your symptoms.        Preoperative Fasting Guidelines     Why must I stop eating and drinking near surgery time?  With sedation, food or liquid in your stomach can enter your lungs causing serious complications  Increases nausea and vomiting     When do I need to stop eating and drinking before my surgery?  Do not eat any food after midnight the night before your surgery/procedure.  You may have up to TEN ounces of clear liquid until TWO hours before your instructed arrival time to the hospital.   This includes water, black tea/coffee, (no milk or cream) apple juice, and electrolyte drinks (Gatorade)  You may chew gum until TWO hours before your surgery/procedure        Additional Instructions:      The Day before Surgery:  -Review your medication instructions, stop indicated medications  -You will be contacted in the evening regarding the time of your arrival to facility and surgery time     Day of Surgery:  -Review your medication instructions, take indicated medications  -Wear comfortable loose fitting clothing  -Do not use moisturizers, creams, lotions or perfume  -All jewelry and valuables should be left at home                   Preoperative Brain Exercises     What are brain exercises?  A brain exercise is any activity that engages your thinking (cognitive) skills.     What types of activities are considered brain exercises?  Jigsaw puzzles, crossword puzzles, word jumble, memory games, word search, and many more.  Many can be found free online or on your phone via a mobile angela.     Why should I do brain exercises before my surgery?  More recent research has shown brain exercise before surgery can lower the risk of postoperative delirium (confusion) which can be especially important for older adults.  Patients who did brain exercises for 5 to 10 minutes/day in the days before surgery, cut their risk of postoperative delirium in half up to 1 week after surgery.                         [facility]     Preoperative Deep Breathing Exercises     Why it is important to do deep breathing exercises before my surgery?  Deep breathing exercises strengthen your breathing muscles.  This helps you to recover after your surgery and decreases the chance of breathing complications.        How are the deep breathing exercises done?  Sit straight with your back supported.  Breathe in deeply and slowly through your nose. Your lower rib cage should expand and your abdomen may move forward.  Hold  that breath for 3 to 5 seconds.  Breathe out through pursed lips, slowly and completely.  Rest and repeat 10 times every hour while awake.  Rest longer if you become dizzy or lightheaded.                      [facility]     Preoperative Deep Breathing Exercises     Why it is important to do deep breathing exercises before my surgery?  Deep breathing exercises strengthen your breathing muscles.  This helps you to recover after your surgery and decreases the chance of breathing complications.        How are the deep breathing exercises done?  Sit straight with your back supported.  Breathe in deeply and slowly through your nose. Your lower rib cage should expand and your abdomen may move forward.  Hold that breath for 3 to 5 seconds.  Breathe out through pursed lips, slowly and completely.  Rest and repeat 10 times every hour while awake.  Rest longer if you become dizzy or lightheaded.        Patient Information: Incentive Spirometer  What is an incentive spirometer?  An incentive spirometer is a device used before and after surgery to “exercise” your lungs.  It helps you to take deeper breaths to expand your lungs.  Below is an example of a basic incentive spirometer.  The device you receive may differ slightly but they all function the same.    Why do I need to use an incentive spirometer?  Using your incentive spirometer prepares your lungs for surgery and helps prevent lung problems after surgery.  How do I use my incentive spirometer?  When you're using your incentive spirometer, make sure to breathe through your mouth. If you breathe through your nose, the incentive spirometer won't work properly. You can hold your nose if you have trouble.  If you feel dizzy at any time, stop and rest. Try again at a later time.  Follow the steps below:  Set up your incentive spirometer, expand the flexible tubing and connect to the outlet.  Sit upright in a chair or bed. Hold the incentive  spirometer at eye level.   Put the mouthpiece in your mouth and close your lips tightly around it. Slowly breathe out (exhale) completely.  Breathe in (inhale) slowly through your mouth as deeply as you can. As you take a breath, you will see the piston rise inside the large column. While the piston rises, the indicator should move upwards. It should stay in between the 2 arrows (see Figure).  Try to get the piston as high as you can, while keeping the indicator between the arrows.   If the indicator doesn't stay between the arrows, you're breathing either too fast or too slow.  When you get it as high as you can, hold your breath for 10 seconds, or as long as possible. While you're holding your breath, the piston will slowly fall to the base of the spirometer.  Once the piston reaches the bottom of the spirometer, breathe out slowly through your mouth. Rest for a few seconds.  Repeat 10 times. Try to get the piston to the same level with each breath.  Repeat every hour while awake  You can carefully clean the outside of the mouthpiece with an alcohol wipe or soap and water.       Patient and Family Education             Ways You Can Help Prevent Blood Clots                    This handout explains some simple things you can do to help prevent blood clots.      Blood clots are blockages that can form in the body's veins. When a blood clot forms in your deep veins, it may be called a deep vein thrombosis, or DVT for short. Blood clots can happen in any part of the body where blood flows, but they are most common in the arms and legs. If a piece of a blood clot breaks free and travels to the lungs, it is called a pulmonary embolus (PE). A PE can be a very serious problem.         Being in the hospital or having surgery can raise your chances of getting a blood clot because you may not be well enough to move around as much as you normally do.         Ways you can help prevent blood clots in the hospital            Wearing SCDs. SCDs stands for Sequential Compression Devices.   SCDs are special sleeves that wrap around your legs  They attach to a pump that fills them with air to gently squeeze your legs every few minutes.   This helps return the blood in your legs to your heart.   SCDs should only be taken off when walking or bathing.   SCDs may not be comfortable, but they can help save your life.                                            Wearing compression stockings - if your doctor orders them. These special snug fitting stockings gently squeeze your legs to help blood flow.       Walking. Walking helps move the blood in your legs.   If your doctor says it is ok, try walking the halls at least   5 times a day. Ask us to help you get up, so you don't fall.      Taking any blood thinning medicines your doctor orders.        Page 1 of 2            [facility]; 3/23   Ways you can help prevent blood clots at home         Wearing compression stockings - if your doctor orders them. ? Walking - to help move the blood in your legs.       Taking any blood thinning medicines your doctor orders.      Signs of a blood clot or PE        Tell your doctor or nurse know right away if you have of the problems listed below.    If you are at home, seek medical care right away. Call 911 for chest pain or problems breathing.                Signs of a blood clot (DVT) - such as pain,  swelling, redness or warmth in your arm or leg      Signs of a pulmonary embolism (PE) - such as chest pain or feeling short of breath

## 2025-02-14 LAB
ATRIAL RATE: 60 BPM
P AXIS: 19 DEGREES
P OFFSET: 183 MS
P ONSET: 123 MS
PR INTERVAL: 174 MS
Q ONSET: 210 MS
QRS COUNT: 10 BEATS
QRS DURATION: 108 MS
QT INTERVAL: 400 MS
QTC CALCULATION(BAZETT): 400 MS
QTC FREDERICIA: 400 MS
R AXIS: -1 DEGREES
T AXIS: 17 DEGREES
T OFFSET: 410 MS
VENTRICULAR RATE: 60 BPM

## 2025-02-25 ENCOUNTER — ANESTHESIA EVENT (OUTPATIENT)
Dept: OPERATING ROOM | Facility: HOSPITAL | Age: 55
End: 2025-02-25
Payer: COMMERCIAL

## 2025-02-25 ENCOUNTER — APPOINTMENT (OUTPATIENT)
Dept: RADIOLOGY | Facility: HOSPITAL | Age: 55
End: 2025-02-25
Payer: COMMERCIAL

## 2025-02-25 ENCOUNTER — ANESTHESIA (OUTPATIENT)
Dept: OPERATING ROOM | Facility: HOSPITAL | Age: 55
End: 2025-02-25
Payer: COMMERCIAL

## 2025-02-25 ENCOUNTER — HOSPITAL ENCOUNTER (OUTPATIENT)
Facility: HOSPITAL | Age: 55
Setting detail: OUTPATIENT SURGERY
Discharge: HOME | End: 2025-02-25
Attending: STUDENT IN AN ORGANIZED HEALTH CARE EDUCATION/TRAINING PROGRAM | Admitting: STUDENT IN AN ORGANIZED HEALTH CARE EDUCATION/TRAINING PROGRAM
Payer: COMMERCIAL

## 2025-02-25 VITALS
RESPIRATION RATE: 18 BRPM | SYSTOLIC BLOOD PRESSURE: 158 MMHG | DIASTOLIC BLOOD PRESSURE: 96 MMHG | HEIGHT: 70 IN | TEMPERATURE: 97.3 F | HEART RATE: 58 BPM | OXYGEN SATURATION: 96 % | BODY MASS INDEX: 36.04 KG/M2 | WEIGHT: 251.77 LBS

## 2025-02-25 DIAGNOSIS — C67.2 MALIGNANT NEOPLASM OF LATERAL WALL OF URINARY BLADDER (MULTI): ICD-10-CM

## 2025-02-25 DIAGNOSIS — C66.2 URETERAL CANCER, LEFT (MULTI): Primary | ICD-10-CM

## 2025-02-25 PROCEDURE — 2780000003 HC OR 278 NO HCPCS: Performed by: STUDENT IN AN ORGANIZED HEALTH CARE EDUCATION/TRAINING PROGRAM

## 2025-02-25 PROCEDURE — 52332 CYSTOSCOPY AND TREATMENT: CPT | Performed by: STUDENT IN AN ORGANIZED HEALTH CARE EDUCATION/TRAINING PROGRAM

## 2025-02-25 PROCEDURE — 3700000001 HC GENERAL ANESTHESIA TIME - INITIAL BASE CHARGE: Performed by: STUDENT IN AN ORGANIZED HEALTH CARE EDUCATION/TRAINING PROGRAM

## 2025-02-25 PROCEDURE — 88112 CYTOPATH CELL ENHANCE TECH: CPT | Mod: TC,MCY,BEALAB | Performed by: STUDENT IN AN ORGANIZED HEALTH CARE EDUCATION/TRAINING PROGRAM

## 2025-02-25 PROCEDURE — 3700000002 HC GENERAL ANESTHESIA TIME - EACH INCREMENTAL 1 MINUTE: Performed by: STUDENT IN AN ORGANIZED HEALTH CARE EDUCATION/TRAINING PROGRAM

## 2025-02-25 PROCEDURE — A52351 PR CYSTO/URETERO/PYELOSCOPY, DX: Performed by: ANESTHESIOLOGY

## 2025-02-25 PROCEDURE — 7100000010 HC PHASE TWO TIME - EACH INCREMENTAL 1 MINUTE: Performed by: STUDENT IN AN ORGANIZED HEALTH CARE EDUCATION/TRAINING PROGRAM

## 2025-02-25 PROCEDURE — 74420 UROGRAPHY RTRGR +-KUB: CPT

## 2025-02-25 PROCEDURE — C1769 GUIDE WIRE: HCPCS | Performed by: STUDENT IN AN ORGANIZED HEALTH CARE EDUCATION/TRAINING PROGRAM

## 2025-02-25 PROCEDURE — 7100000009 HC PHASE TWO TIME - INITIAL BASE CHARGE: Performed by: STUDENT IN AN ORGANIZED HEALTH CARE EDUCATION/TRAINING PROGRAM

## 2025-02-25 PROCEDURE — 74420 UROGRAPHY RTRGR +-KUB: CPT | Performed by: STUDENT IN AN ORGANIZED HEALTH CARE EDUCATION/TRAINING PROGRAM

## 2025-02-25 PROCEDURE — 2720000007 HC OR 272 NO HCPCS: Performed by: STUDENT IN AN ORGANIZED HEALTH CARE EDUCATION/TRAINING PROGRAM

## 2025-02-25 PROCEDURE — A52351 PR CYSTO/URETERO/PYELOSCOPY, DX: Performed by: ANESTHESIOLOGIST ASSISTANT

## 2025-02-25 PROCEDURE — C2617 STENT, NON-COR, TEM W/O DEL: HCPCS | Performed by: STUDENT IN AN ORGANIZED HEALTH CARE EDUCATION/TRAINING PROGRAM

## 2025-02-25 PROCEDURE — 7100000002 HC RECOVERY ROOM TIME - EACH INCREMENTAL 1 MINUTE: Performed by: STUDENT IN AN ORGANIZED HEALTH CARE EDUCATION/TRAINING PROGRAM

## 2025-02-25 PROCEDURE — 88112 CYTOPATH CELL ENHANCE TECH: CPT | Performed by: STUDENT IN AN ORGANIZED HEALTH CARE EDUCATION/TRAINING PROGRAM

## 2025-02-25 PROCEDURE — C1758 CATHETER, URETERAL: HCPCS | Performed by: STUDENT IN AN ORGANIZED HEALTH CARE EDUCATION/TRAINING PROGRAM

## 2025-02-25 PROCEDURE — 2500000004 HC RX 250 GENERAL PHARMACY W/ HCPCS (ALT 636 FOR OP/ED): Performed by: ANESTHESIOLOGIST ASSISTANT

## 2025-02-25 PROCEDURE — 2500000004 HC RX 250 GENERAL PHARMACY W/ HCPCS (ALT 636 FOR OP/ED): Performed by: STUDENT IN AN ORGANIZED HEALTH CARE EDUCATION/TRAINING PROGRAM

## 2025-02-25 PROCEDURE — 52351 CYSTOURETERO & OR PYELOSCOPE: CPT | Performed by: STUDENT IN AN ORGANIZED HEALTH CARE EDUCATION/TRAINING PROGRAM

## 2025-02-25 PROCEDURE — 3600000003 HC OR TIME - INITIAL BASE CHARGE - PROCEDURE LEVEL THREE: Performed by: STUDENT IN AN ORGANIZED HEALTH CARE EDUCATION/TRAINING PROGRAM

## 2025-02-25 PROCEDURE — C1894 INTRO/SHEATH, NON-LASER: HCPCS | Performed by: STUDENT IN AN ORGANIZED HEALTH CARE EDUCATION/TRAINING PROGRAM

## 2025-02-25 PROCEDURE — 7100000001 HC RECOVERY ROOM TIME - INITIAL BASE CHARGE: Performed by: STUDENT IN AN ORGANIZED HEALTH CARE EDUCATION/TRAINING PROGRAM

## 2025-02-25 PROCEDURE — 2550000001 HC RX 255 CONTRASTS: Performed by: STUDENT IN AN ORGANIZED HEALTH CARE EDUCATION/TRAINING PROGRAM

## 2025-02-25 PROCEDURE — 3600000008 HC OR TIME - EACH INCREMENTAL 1 MINUTE - PROCEDURE LEVEL THREE: Performed by: STUDENT IN AN ORGANIZED HEALTH CARE EDUCATION/TRAINING PROGRAM

## 2025-02-25 DEVICE — URETERAL STENT
Type: IMPLANTABLE DEVICE | Site: URETER | Status: FUNCTIONAL
Brand: CONTOUR™

## 2025-02-25 RX ORDER — ALBUTEROL SULFATE 0.83 MG/ML
2.5 SOLUTION RESPIRATORY (INHALATION) ONCE
Status: DISCONTINUED | OUTPATIENT
Start: 2025-02-25 | End: 2025-02-25 | Stop reason: HOSPADM

## 2025-02-25 RX ORDER — DIPHENHYDRAMINE HYDROCHLORIDE 50 MG/ML
12.5 INJECTION INTRAMUSCULAR; INTRAVENOUS ONCE AS NEEDED
Status: DISCONTINUED | OUTPATIENT
Start: 2025-02-25 | End: 2025-02-25 | Stop reason: HOSPADM

## 2025-02-25 RX ORDER — ALBUTEROL SULFATE 0.83 MG/ML
2.5 SOLUTION RESPIRATORY (INHALATION) ONCE AS NEEDED
Status: DISCONTINUED | OUTPATIENT
Start: 2025-02-25 | End: 2025-02-25 | Stop reason: HOSPADM

## 2025-02-25 RX ORDER — NORETHINDRONE AND ETHINYL ESTRADIOL 0.5-0.035
50 KIT ORAL ONCE
Status: DISCONTINUED | OUTPATIENT
Start: 2025-02-25 | End: 2025-02-25 | Stop reason: HOSPADM

## 2025-02-25 RX ORDER — HYDROMORPHONE HYDROCHLORIDE 0.2 MG/ML
0.1 INJECTION INTRAMUSCULAR; INTRAVENOUS; SUBCUTANEOUS EVERY 5 MIN PRN
Status: DISCONTINUED | OUTPATIENT
Start: 2025-02-25 | End: 2025-02-25 | Stop reason: HOSPADM

## 2025-02-25 RX ORDER — ONDANSETRON HYDROCHLORIDE 2 MG/ML
INJECTION, SOLUTION INTRAVENOUS AS NEEDED
Status: DISCONTINUED | OUTPATIENT
Start: 2025-02-25 | End: 2025-02-25

## 2025-02-25 RX ORDER — GLYCOPYRROLATE 0.2 MG/ML
0.2 INJECTION INTRAMUSCULAR; INTRAVENOUS ONCE
Status: DISCONTINUED | OUTPATIENT
Start: 2025-02-25 | End: 2025-02-25 | Stop reason: HOSPADM

## 2025-02-25 RX ORDER — OXYBUTYNIN CHLORIDE 5 MG/1
5 TABLET ORAL 3 TIMES DAILY
Qty: 30 TABLET | Refills: 0 | OUTPATIENT
Start: 2025-02-25

## 2025-02-25 RX ORDER — KETOROLAC TROMETHAMINE 15 MG/ML
15 INJECTION, SOLUTION INTRAMUSCULAR; INTRAVENOUS ONCE AS NEEDED
Status: DISCONTINUED | OUTPATIENT
Start: 2025-02-25 | End: 2025-02-25 | Stop reason: HOSPADM

## 2025-02-25 RX ORDER — MIDAZOLAM HYDROCHLORIDE 1 MG/ML
1 INJECTION, SOLUTION INTRAMUSCULAR; INTRAVENOUS ONCE AS NEEDED
Status: DISCONTINUED | OUTPATIENT
Start: 2025-02-25 | End: 2025-02-25 | Stop reason: HOSPADM

## 2025-02-25 RX ORDER — LIDOCAINE HYDROCHLORIDE 10 MG/ML
INJECTION, SOLUTION EPIDURAL; INFILTRATION; INTRACAUDAL; PERINEURAL AS NEEDED
Status: DISCONTINUED | OUTPATIENT
Start: 2025-02-25 | End: 2025-02-25

## 2025-02-25 RX ORDER — TAMSULOSIN HYDROCHLORIDE 0.4 MG/1
0.4 CAPSULE ORAL
Qty: 10 CAPSULE | Refills: 0 | Status: SHIPPED | OUTPATIENT
Start: 2025-02-25

## 2025-02-25 RX ORDER — PROPOFOL 10 MG/ML
INJECTION, EMULSION INTRAVENOUS AS NEEDED
Status: DISCONTINUED | OUTPATIENT
Start: 2025-02-25 | End: 2025-02-25

## 2025-02-25 RX ORDER — TAMSULOSIN HYDROCHLORIDE 0.4 MG/1
0.4 CAPSULE ORAL
Qty: 14 CAPSULE | Refills: 0 | OUTPATIENT
Start: 2025-02-25

## 2025-02-25 RX ORDER — CEFAZOLIN SODIUM 2 G/100ML
2 INJECTION, SOLUTION INTRAVENOUS ONCE
Status: COMPLETED | OUTPATIENT
Start: 2025-02-25 | End: 2025-02-25

## 2025-02-25 RX ORDER — ONDANSETRON HYDROCHLORIDE 2 MG/ML
4 INJECTION, SOLUTION INTRAVENOUS EVERY 8 HOURS PRN
Status: DISCONTINUED | OUTPATIENT
Start: 2025-02-25 | End: 2025-02-25 | Stop reason: HOSPADM

## 2025-02-25 RX ORDER — MEPERIDINE HYDROCHLORIDE 25 MG/ML
12.5 INJECTION INTRAMUSCULAR; INTRAVENOUS; SUBCUTANEOUS EVERY 10 MIN PRN
Status: DISCONTINUED | OUTPATIENT
Start: 2025-02-25 | End: 2025-02-25 | Stop reason: HOSPADM

## 2025-02-25 RX ORDER — ONDANSETRON 4 MG/1
4 TABLET, ORALLY DISINTEGRATING ORAL ONCE AS NEEDED
Status: DISCONTINUED | OUTPATIENT
Start: 2025-02-25 | End: 2025-02-25 | Stop reason: HOSPADM

## 2025-02-25 RX ORDER — MIDAZOLAM HYDROCHLORIDE 1 MG/ML
INJECTION, SOLUTION INTRAMUSCULAR; INTRAVENOUS AS NEEDED
Status: DISCONTINUED | OUTPATIENT
Start: 2025-02-25 | End: 2025-02-25

## 2025-02-25 RX ORDER — OXYBUTYNIN CHLORIDE 5 MG/1
5 TABLET ORAL 3 TIMES DAILY
Qty: 30 TABLET | Refills: 0 | Status: SHIPPED | OUTPATIENT
Start: 2025-02-25

## 2025-02-25 RX ORDER — ONDANSETRON HYDROCHLORIDE 2 MG/ML
4 INJECTION, SOLUTION INTRAVENOUS ONCE AS NEEDED
Status: DISCONTINUED | OUTPATIENT
Start: 2025-02-25 | End: 2025-02-25 | Stop reason: HOSPADM

## 2025-02-25 RX ORDER — CHLORHEXIDINE GLUCONATE 40 MG/ML
SOLUTION TOPICAL DAILY PRN
Status: DISCONTINUED | OUTPATIENT
Start: 2025-02-25 | End: 2025-02-25 | Stop reason: HOSPADM

## 2025-02-25 RX ORDER — FAMOTIDINE 20 MG/1
20 TABLET, FILM COATED ORAL ONCE
Status: DISCONTINUED | OUTPATIENT
Start: 2025-02-25 | End: 2025-02-25 | Stop reason: HOSPADM

## 2025-02-25 RX ORDER — METOCLOPRAMIDE 10 MG/1
10 TABLET ORAL ONCE
Status: DISCONTINUED | OUTPATIENT
Start: 2025-02-25 | End: 2025-02-25 | Stop reason: HOSPADM

## 2025-02-25 RX ORDER — FENTANYL CITRATE 50 UG/ML
INJECTION, SOLUTION INTRAMUSCULAR; INTRAVENOUS AS NEEDED
Status: DISCONTINUED | OUTPATIENT
Start: 2025-02-25 | End: 2025-02-25

## 2025-02-25 RX ORDER — HYDRALAZINE HYDROCHLORIDE 20 MG/ML
10 INJECTION INTRAMUSCULAR; INTRAVENOUS EVERY 30 MIN PRN
Status: DISCONTINUED | OUTPATIENT
Start: 2025-02-25 | End: 2025-02-25 | Stop reason: HOSPADM

## 2025-02-25 RX ORDER — OXYCODONE HYDROCHLORIDE 5 MG/1
5 TABLET ORAL ONCE AS NEEDED
Status: DISCONTINUED | OUTPATIENT
Start: 2025-02-25 | End: 2025-02-25 | Stop reason: HOSPADM

## 2025-02-25 RX ORDER — LABETALOL HYDROCHLORIDE 5 MG/ML
10 INJECTION, SOLUTION INTRAVENOUS ONCE AS NEEDED
Status: DISCONTINUED | OUTPATIENT
Start: 2025-02-25 | End: 2025-02-25 | Stop reason: HOSPADM

## 2025-02-25 RX ORDER — SODIUM CHLORIDE, SODIUM LACTATE, POTASSIUM CHLORIDE, CALCIUM CHLORIDE 600; 310; 30; 20 MG/100ML; MG/100ML; MG/100ML; MG/100ML
75 INJECTION, SOLUTION INTRAVENOUS CONTINUOUS
Status: DISCONTINUED | OUTPATIENT
Start: 2025-02-25 | End: 2025-02-25 | Stop reason: HOSPADM

## 2025-02-25 RX ORDER — LIDOCAINE HYDROCHLORIDE 10 MG/ML
0.1 INJECTION, SOLUTION EPIDURAL; INFILTRATION; INTRACAUDAL; PERINEURAL ONCE
Status: DISCONTINUED | OUTPATIENT
Start: 2025-02-25 | End: 2025-02-25 | Stop reason: HOSPADM

## 2025-02-25 RX ADMIN — DEXAMETHASONE SODIUM PHOSPHATE 4 MG: 4 INJECTION, SOLUTION INTRAMUSCULAR; INTRAVENOUS at 11:43

## 2025-02-25 RX ADMIN — PROPOFOL 200 MG: 10 INJECTION, EMULSION INTRAVENOUS at 11:35

## 2025-02-25 RX ADMIN — FENTANYL CITRATE 50 MCG: 50 INJECTION INTRAMUSCULAR; INTRAVENOUS at 11:35

## 2025-02-25 RX ADMIN — ONDANSETRON 4 MG: 2 INJECTION, SOLUTION INTRAMUSCULAR; INTRAVENOUS at 11:43

## 2025-02-25 RX ADMIN — FENTANYL CITRATE 25 MCG: 50 INJECTION INTRAMUSCULAR; INTRAVENOUS at 12:02

## 2025-02-25 RX ADMIN — CEFAZOLIN SODIUM 2 G: 2 INJECTION, SOLUTION INTRAVENOUS at 11:35

## 2025-02-25 RX ADMIN — SODIUM CHLORIDE, POTASSIUM CHLORIDE, SODIUM LACTATE AND CALCIUM CHLORIDE: 600; 310; 30; 20 INJECTION, SOLUTION INTRAVENOUS at 11:25

## 2025-02-25 RX ADMIN — LIDOCAINE HYDROCHLORIDE 5 ML: 10 INJECTION, SOLUTION EPIDURAL; INFILTRATION; INTRACAUDAL; PERINEURAL at 11:35

## 2025-02-25 RX ADMIN — MIDAZOLAM 2 MG: 1 INJECTION INTRAMUSCULAR; INTRAVENOUS at 11:29

## 2025-02-25 SDOH — HEALTH STABILITY: MENTAL HEALTH: CURRENT SMOKER: 0

## 2025-02-25 ASSESSMENT — PAIN - FUNCTIONAL ASSESSMENT
PAIN_FUNCTIONAL_ASSESSMENT: 0-10

## 2025-02-25 ASSESSMENT — PAIN SCALES - GENERAL
PAINLEVEL_OUTOF10: 0 - NO PAIN
PAINLEVEL_OUTOF10: 5 - MODERATE PAIN
PAIN_LEVEL: 2
PAINLEVEL_OUTOF10: 0 - NO PAIN
PAINLEVEL_OUTOF10: 2
PAINLEVEL_OUTOF10: 0 - NO PAIN
PAINLEVEL_OUTOF10: 0 - NO PAIN

## 2025-02-25 ASSESSMENT — COLUMBIA-SUICIDE SEVERITY RATING SCALE - C-SSRS
6. HAVE YOU EVER DONE ANYTHING, STARTED TO DO ANYTHING, OR PREPARED TO DO ANYTHING TO END YOUR LIFE?: NO
2. HAVE YOU ACTUALLY HAD ANY THOUGHTS OF KILLING YOURSELF?: NO
1. IN THE PAST MONTH, HAVE YOU WISHED YOU WERE DEAD OR WISHED YOU COULD GO TO SLEEP AND NOT WAKE UP?: NO

## 2025-02-25 NOTE — OP NOTE
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  ASA: ASA status not filed in the log.  Estimated Blood Loss: 5 mL  Intra-op Medications: Administrations occurring from 1025 to 1145 on 25:  * No intraprocedure medications in log *           Anesthesia Record               Intraprocedure I/O Totals       None           Specimen: No specimens collected              Drains and/or Catheters: * None in log *      Findings: No tumors in the bladder. Cytology from bladder cytology from left upper urinary tract obtained.  Left ureter in pelvis showed some areas of spotty raised erythema but no papillary tumors.  We did have to use an access sheath so a stent was placed.    Indications: Cornell Larios is an 55 y.o. 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  He underwent blue light cystoscopy, biopsies which were all negative.  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    The patient was seen in the preoperative area. The risks, benefits, complications, treatment options, non-operative alternatives, expected recovery and outcomes were discussed with the patient. The possibilities of reaction to  medication, pulmonary aspiration, injury to surrounding structures, bleeding, recurrent infection, the need for additional procedures, failure to diagnose a condition, and creating a complication requiring transfusion or operation were discussed with the patient. The patient concurred with the proposed plan, giving informed consent.  The site of surgery was properly noted/marked if necessary per policy. The patient has been actively warmed in preoperative area. Preoperative antibiotics have been ordered and given within 1 hours of incision. Venous thrombosis prophylaxis have been ordered including bilateral sequential compression devices    Procedure Details:     The patient was taken to the OR.  A time out was done.  General anesthesia induced.  LMA placed.  Antibiotics given.  The patient was placed in dorsal lithotomy and genitalia was prepped and draped.    A cystoscope was inserted into the bladder.  He has a high bladder neck.  Cystoscopy was done after cytology was collected.  No tumors were seen.  Previous scars seen scattered throughout.    The left ureteral orifice was found and cannulated with a wire and a dual lumen was advanced.  A barbatoge of the urine was done for cytology.  Retrogrades were done and saved and were normal.    Two wires were placed to the level of the kidney.  We tried to do URS over the wire but the ureteroscope would not make it around the bladder neck.  We then placed an access sheath.  Ureteroscopy was done.  The findings are as above.  Only raised erythematous lesions noted throughout.  Nothing able to biopsy.  No papillary tumors.  We did direct inspection of the ureter on the way out and no trauma or tumors noted.      We then placed a stent after reinserting the cystoscope into the bladder.  This had good evidence of proximal and distal curl on fluoro and direct.  The bladder was partially emptied.      Complications:  None; patient tolerated the procedure well.     Disposition: PACU - hemodynamically stable.  Condition: stable                 Additional Details:     Attending Attestation:     Agapito Coyne  Phone Number: [phone]

## 2025-02-25 NOTE — PERIOPERATIVE NURSING NOTE
"1224 Arrives to pacu 2 with AA. Denies pain and nausea. Sinus pradeep. Lungs clear . No pain or nausea.  Moving bilateral feet sequentials continued from OR. IV patent. Shivering Corrine hugger applied.  1234 Arouses easily to verbal Denies pain no nausea \" just sleepy\" resting Corrine hugger continued.  "

## 2025-02-25 NOTE — ANESTHESIA POSTPROCEDURE EVALUATION
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    Anesthesia Post Evaluation    Patient location during evaluation: bedside  Patient participation: complete - patient participated  Level of consciousness: awake  Pain score: 2  Pain management: adequate  Airway patency: patent  Two or more strategies used to mitigate risk of obstructive sleep apnea  Cardiovascular status: acceptable  Respiratory status: acceptable  Hydration status: acceptable  Postoperative Nausea and Vomiting: none  Comments: See intraoperative record for anesthetic actions related to the preoperative anesthesia plan.        There were no known notable events for this encounter.

## 2025-02-25 NOTE — ANESTHESIA PREPROCEDURE EVALUATION
Patient: Cornell Larios    Procedure Information       Date/Time: 02/25/25 1025    Procedure: BIOPSY, URETER (Left)    Location: [facility] 02 / [facility]    Surgeons: Agapito Coyne MD MPH            Relevant Problems   No relevant active problems       Clinical information reviewed:   Tobacco  Allergies  Meds   Med Hx  Surg Hx   Fam Hx  Soc Hx        NPO Detail:  NPO/Void Status  Date of Last Liquid: 02/24/25  Time of Last Liquid: 0600  Date of Last Solid: 02/24/25  Time of Last Solid: 1800  Time of Last Void: 1028         Physical Exam    Airway  Mallampati: II  TM distance: >3 FB     Cardiovascular   Rhythm: regular  Rate: normal     Dental - normal exam     Pulmonary   Breath sounds clear to auscultation     Abdominal   Abdomen: soft             Anesthesia Plan    History of general anesthesia?: unknown/emergency  History of complications of general anesthesia?: no    ASA 3     general and other   There is reasoning for not using neuraxial anesthesia or a peripheral nerve block.  (Labs acceptable, EKG sr non-acute, past GA ETT and LMA5)  The patient is not a current smoker.  Education provided regarding risk of obstructive sleep apnea. (in appropriate circumstances)  intravenous induction   Anesthetic plan and risks discussed with patient.    Plan discussed with CRNA, CAA and attending.

## 2025-02-25 NOTE — DISCHARGE INSTRUCTIONS
Post-Operative Instructions:  Ureteroscopy    You have a ureteral stent.  This is taped to your leg.  You may remove this at home by pulling the string on Monday 3/3 am.  You may take the medications prescribed for stent related pain.  You may have frequency and urgency with the stent.  These medications may be stopped after the stent is removed.       Medications  Tylenol and/or Motrin may be given to help with generalized discomfort after surgery.   Pyridium may be taken to help if you have burning with urination - this may turn your urine orange.  You may have been prescribed additional narcotics, such as Tramadol, for pain. Use these sparingly as they can cause confusion, fatigue, nausea, and constipation. Take a stool softener to help avoid constipation.   Keep taking any medications that you have been on for urination.   If you are on blood thinners - usually these can be restarted in 2-3 days if your urine is clear or pink, unless the doctor tells you otherwise. You may continue taking Aspirin immediately.      Activity  You may resume your normal diet. You should stick to fluids and bland foods at first, as you may be nauseous after surgery.   Make sure to drink plenty of water and stay hydrated  You may shower immediately after surgery.  Avoid constipation, heavy lifting, and straining your abdomen - these can cause blood in the urine. There are no restrictions on walking, stairs, etc.      Things to expect after surgery  Blood in urine/catheter if present, passage of small clots, and spotting in your underwear.   The urge to urinate frequently or difficulty with urination.  Burning/stinging/pain with urination is normal and can last for several days after surgery.  If you have a catheter:  You may have lower abdominal spasms or feeling like you need to urinate.  Make sure to change to the larger bag at night time if you have a day time leg bag.  If your catheter stops draining urine and you feel your  bladder getting full, call the office immediately.     When to Call the Surgeon:  Fever higher than 102?F  Repeated vomiting with inability to keep down fluids  Severe pain not controlled with medication  Inability to urinate for more than 8 hours or if catheter stops draining (if one is present)  For questions or problems, call our office at [phone]  Evenings and weekends: call the hospital  [phone] and ask for the [id number].  In case of emergency, call [phone].     Follow-Up Appointment:  You will receive a phone call with your follow-up appointment details. Please call [phone] if you do not hear from our office within 2 days or if you need to reschedule.

## 2025-02-25 NOTE — ANESTHESIA PROCEDURE NOTES
Airway  Date/Time: 2/25/2025 11:36 AM  Urgency: elective      Staffing  Performed: DIYA   Authorized by: Tang Amaya DO    Performed by: DIYA Garcia  Patient location during procedure: OR    Indications and Patient Condition  Indications for airway management: anesthesia  Spontaneous Ventilation: absent  Sedation level: deep  Preoxygenated: yes  Mask difficulty assessment: 1 - vent by mask    Final Airway Details  Final airway type: supraglottic airway      Successful airway: Size 4     Number of attempts at approach: 1

## 2025-03-22 ENCOUNTER — APPOINTMENT (OUTPATIENT)
Dept: RADIOLOGY | Facility: HOSPITAL | Age: 55
End: 2025-03-22
Payer: COMMERCIAL

## 2025-03-22 ENCOUNTER — HOSPITAL ENCOUNTER (EMERGENCY)
Facility: HOSPITAL | Age: 55
Discharge: HOME | End: 2025-03-22
Attending: EMERGENCY MEDICINE
Payer: COMMERCIAL

## 2025-03-22 VITALS
SYSTOLIC BLOOD PRESSURE: 161 MMHG | WEIGHT: 250 LBS | HEART RATE: 72 BPM | DIASTOLIC BLOOD PRESSURE: 92 MMHG | RESPIRATION RATE: 18 BRPM | BODY MASS INDEX: 35.79 KG/M2 | OXYGEN SATURATION: 99 % | TEMPERATURE: 98.8 F | HEIGHT: 70 IN

## 2025-03-22 DIAGNOSIS — S82.402A CLOSED FRACTURE OF SHAFT OF LEFT FIBULA, UNSPECIFIED FRACTURE MORPHOLOGY, INITIAL ENCOUNTER: ICD-10-CM

## 2025-03-22 DIAGNOSIS — S80.12XA CONTUSION OF LEFT LOWER LEG, INITIAL ENCOUNTER: Primary | ICD-10-CM

## 2025-03-22 PROCEDURE — 73590 X-RAY EXAM OF LOWER LEG: CPT | Mod: LEFT SIDE | Performed by: RADIOLOGY

## 2025-03-22 PROCEDURE — 2500000004 HC RX 250 GENERAL PHARMACY W/ HCPCS (ALT 636 FOR OP/ED): Performed by: EMERGENCY MEDICINE

## 2025-03-22 PROCEDURE — 96372 THER/PROPH/DIAG INJ SC/IM: CPT | Performed by: EMERGENCY MEDICINE

## 2025-03-22 PROCEDURE — 73590 X-RAY EXAM OF LOWER LEG: CPT | Mod: LT

## 2025-03-22 PROCEDURE — 99284 EMERGENCY DEPT VISIT MOD MDM: CPT | Performed by: EMERGENCY MEDICINE

## 2025-03-22 RX ORDER — OXYCODONE AND ACETAMINOPHEN 5; 325 MG/1; MG/1
1 TABLET ORAL 4 TIMES DAILY
Qty: 12 TABLET | Refills: 0 | Status: SHIPPED | OUTPATIENT
Start: 2025-03-22 | End: 2025-03-25

## 2025-03-22 RX ORDER — IBUPROFEN 600 MG/1
600 TABLET ORAL 3 TIMES DAILY
Qty: 30 TABLET | Refills: 0 | Status: SHIPPED | OUTPATIENT
Start: 2025-03-22

## 2025-03-22 RX ORDER — KETOROLAC TROMETHAMINE 30 MG/ML
60 INJECTION, SOLUTION INTRAMUSCULAR; INTRAVENOUS ONCE
Status: COMPLETED | OUTPATIENT
Start: 2025-03-22 | End: 2025-03-22

## 2025-03-22 RX ADMIN — KETOROLAC TROMETHAMINE 60 MG: 30 INJECTION, SOLUTION INTRAMUSCULAR at 16:05

## 2025-03-22 ASSESSMENT — PAIN DESCRIPTION - LOCATION
LOCATION: LEG
LOCATION: LEG

## 2025-03-22 ASSESSMENT — ENCOUNTER SYMPTOMS
WEAKNESS: 0
JOINT SWELLING: 0
NAUSEA: 0
NECK STIFFNESS: 0
MYALGIAS: 1
NUMBNESS: 0
VOMITING: 0
NECK PAIN: 0
BRUISES/BLEEDS EASILY: 0
ADENOPATHY: 0

## 2025-03-22 ASSESSMENT — PAIN SCALES - GENERAL
PAINLEVEL_OUTOF10: 1

## 2025-03-22 ASSESSMENT — PAIN DESCRIPTION - PAIN TYPE: TYPE: ACUTE PAIN

## 2025-03-22 ASSESSMENT — PAIN DESCRIPTION - FREQUENCY: FREQUENCY: CONSTANT/CONTINUOUS

## 2025-03-22 ASSESSMENT — PAIN DESCRIPTION - ORIENTATION
ORIENTATION: LEFT;LOWER
ORIENTATION: LEFT;LOWER

## 2025-03-22 ASSESSMENT — PAIN - FUNCTIONAL ASSESSMENT
PAIN_FUNCTIONAL_ASSESSMENT: 0-10

## 2025-03-22 ASSESSMENT — COLUMBIA-SUICIDE SEVERITY RATING SCALE - C-SSRS
6. HAVE YOU EVER DONE ANYTHING, STARTED TO DO ANYTHING, OR PREPARED TO DO ANYTHING TO END YOUR LIFE?: NO
1. IN THE PAST MONTH, HAVE YOU WISHED YOU WERE DEAD OR WISHED YOU COULD GO TO SLEEP AND NOT WAKE UP?: NO
2. HAVE YOU ACTUALLY HAD ANY THOUGHTS OF KILLING YOURSELF?: NO

## 2025-03-22 ASSESSMENT — PAIN DESCRIPTION - DESCRIPTORS: DESCRIPTORS: TIGHTNESS;PRESSURE

## 2025-03-22 NOTE — Clinical Note
Cornell Larios was seen and treated in our emergency department on 3/22/2025.  He may return to work on 03/26/2025.       If you have any questions or concerns, please don't hesitate to call.      Pipe Eli MD
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
## 2025-03-24 DIAGNOSIS — S82.839A NONDISPLACED FRACTURE OF PROXIMAL END OF FIBULA: ICD-10-CM

## 2025-03-24 DIAGNOSIS — M79.605 LEFT LEG PAIN: ICD-10-CM

## 2025-03-27 ENCOUNTER — OFFICE VISIT (OUTPATIENT)
Dept: ORTHOPEDIC SURGERY | Facility: CLINIC | Age: 55
End: 2025-03-27
Payer: COMMERCIAL

## 2025-03-27 ENCOUNTER — HOSPITAL ENCOUNTER (OUTPATIENT)
Dept: RADIOLOGY | Facility: CLINIC | Age: 55
Discharge: HOME | End: 2025-03-27
Payer: COMMERCIAL

## 2025-03-27 ENCOUNTER — HOSPITAL ENCOUNTER (OUTPATIENT)
Dept: RADIOLOGY | Facility: EXTERNAL LOCATION | Age: 55
Discharge: HOME | End: 2025-03-27

## 2025-03-27 VITALS — BODY MASS INDEX: 35.87 KG/M2 | WEIGHT: 250 LBS

## 2025-03-27 DIAGNOSIS — M89.8X6 PAIN OF LEFT FIBULA: ICD-10-CM

## 2025-03-27 DIAGNOSIS — S82.452A CLOSED DISPLACED COMMINUTED FRACTURE OF SHAFT OF LEFT FIBULA, INITIAL ENCOUNTER: Primary | ICD-10-CM

## 2025-03-27 DIAGNOSIS — S82.839A NONDISPLACED FRACTURE OF PROXIMAL END OF FIBULA: ICD-10-CM

## 2025-03-27 PROCEDURE — 99204 OFFICE O/P NEW MOD 45 MIN: CPT | Performed by: SPECIALIST

## 2025-03-27 PROCEDURE — 73590 X-RAY EXAM OF LOWER LEG: CPT | Mod: LT

## 2025-03-27 PROCEDURE — 1036F TOBACCO NON-USER: CPT | Performed by: SPECIALIST

## 2025-03-27 PROCEDURE — 76882 US LMTD JT/FCL EVL NVASC XTR: CPT | Performed by: SPECIALIST

## 2025-03-27 ASSESSMENT — PAIN - FUNCTIONAL ASSESSMENT: PAIN_FUNCTIONAL_ASSESSMENT: NO/DENIES PAIN
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
## 2025-03-27 NOTE — ASSESSMENT & PLAN NOTE
Assessment: Left midshaft minimally displaced distal fibula fracture.  No signs of compartment syndrome 1 week status post injury.    Plan:  Weightbearing as tolerated.  I encouraged range of motion of the knee and ankle.  He can use a knee immobilizer on a as needed basis for pain.  He can use a crutch or a cane as needed for pain.  He plans on traveling in a RV.  Because it is his contralateral left leg it should not affect his driving.  He should be safe for this.  I did encourage him to allow others to drive if that is possible.  Baby aspirin a day for the time that he is traveling.  Follow-up in 3 weeks for reevaluation with new x-rays.

## 2025-03-28 ENCOUNTER — APPOINTMENT (OUTPATIENT)
Dept: ORTHOPEDIC SURGERY | Facility: CLINIC | Age: 55
End: 2025-03-28
Payer: COMMERCIAL

## 2025-03-28 ENCOUNTER — APPOINTMENT (OUTPATIENT)
Dept: UROLOGY | Facility: CLINIC | Age: 55
End: 2025-03-28
Payer: COMMERCIAL

## 2025-03-28 VITALS — DIASTOLIC BLOOD PRESSURE: 87 MMHG | HEART RATE: 80 BPM | SYSTOLIC BLOOD PRESSURE: 149 MMHG

## 2025-03-28 DIAGNOSIS — F41.9 ANXIETY: ICD-10-CM

## 2025-03-28 DIAGNOSIS — C66.2 URETERAL CANCER, LEFT (MULTI): ICD-10-CM

## 2025-03-28 PROCEDURE — G2211 COMPLEX E/M VISIT ADD ON: HCPCS | Performed by: STUDENT IN AN ORGANIZED HEALTH CARE EDUCATION/TRAINING PROGRAM

## 2025-03-28 PROCEDURE — 99213 OFFICE O/P EST LOW 20 MIN: CPT | Performed by: STUDENT IN AN ORGANIZED HEALTH CARE EDUCATION/TRAINING PROGRAM

## 2025-03-28 RX ORDER — DIAZEPAM 5 MG/1
5 TABLET ORAL SEE ADMIN INSTRUCTIONS
Qty: 1 TABLET | Refills: 0 | Status: SHIPPED | OUTPATIENT
Start: 2025-03-28

## 2025-03-28 NOTE — PROGRESS NOTES
Subjective    Cornell Larios is a 55 y.o. male  s/p cystoscopy, left ureteroscopy, left retrograde pyelogram on 02/25/25 who presents for POV.    He reports feeling well. He mentions he gets nervous with imaging and would like to take something to calm him down.     Pathology showed sections show urothelial mucosa with prominent atypia that does not meet the diagnostic threshold for carcinoma in situ. Cytology was negative.     He has a history of NMIBC.  He has failed BCG in the past per reports.       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 Right distal ureter abnormality on imaging.  URS negative  Imaging date: 2/2024  Last cysto: 7/2024, negative       Review of Systems    All systems were reviewed. Anything negative was noted in the HPI.    Objective   Physical Exam  Gen: No acute distress      Psych: Alert and oriented x3      Neuro:  Normal ROM     Resp: Nonlabored respirations      CV: Regular rate and rhythm      Abd: S, NT, ND.     : Deferred     Skin: Warm, dry and intact without rashes      Lymphatics: No peripheral edema           Past Medical History:   Diagnosis Date    Bladder cancer (Multi)     bladder cancer diagnosed 6/2023    Hypertension          Past Surgical History:   Procedure Laterality Date    OTHER SURGICAL HISTORY  2023    Bladder cancer removal         Assessment & Plan  Ureteral cancer, left (Multi)   55 y.o. male  s/p cystoscopy, left ureteroscopy, left retrograde pyelogram on 02/25/25. I reviewed her imaging and we discussed this in detail.  Pathology showed sections show urothelial mucosa  with prominent atypia that does not meet the diagnostic threshold for carcinoma in situ. Cytology was negative.      Recent ureteroscopy was negative and repeat cytology negative.  I think there is nothing further to do here beyond surveillance.    PLAN:  CT urogram in 3 months -   Cystoscopy in 3 months with cytology... diazepam ordered to help calm nerves.      Orders:    CT urography w 3D volume rendered imaging; Future    Basic Metabolic Panel; Future    Anxiety                               Scribe Attestation  By signing my name below, I, Bassam Crespo   attest that this documentation has been prepared under the direction and in the presence of Agapito Coyne MD MPH

## 2025-03-28 NOTE — ASSESSMENT & PLAN NOTE
55 y.o. male  s/p cystoscopy, left ureteroscopy, left retrograde pyelogram on 02/25/25. I reviewed her imaging and we discussed this in detail.  Pathology showed sections show urothelial mucosa with prominent atypia that does not meet the diagnostic threshold for carcinoma in situ. Cytology was negative.      Recent ureteroscopy was negative and repeat cytology negative.  I think there is nothing further to do here beyond surveillance.    PLAN:  CT urogram in 3 months -   Cystoscopy in 3 months with cytology... diazepam ordered to help calm nerves.      Orders:    CT urography w 3D volume rendered imaging; Future    Basic Metabolic Panel; Future

## 2025-04-16 ENCOUNTER — HOSPITAL ENCOUNTER (OUTPATIENT)
Dept: RADIOLOGY | Facility: EXTERNAL LOCATION | Age: 55
Discharge: HOME | End: 2025-04-16

## 2025-04-16 ENCOUNTER — HOSPITAL ENCOUNTER (OUTPATIENT)
Dept: RADIOLOGY | Facility: CLINIC | Age: 55
Discharge: HOME | End: 2025-04-16
Payer: COMMERCIAL

## 2025-04-16 ENCOUNTER — APPOINTMENT (OUTPATIENT)
Dept: ORTHOPEDIC SURGERY | Facility: CLINIC | Age: 55
End: 2025-04-16
Payer: COMMERCIAL

## 2025-04-16 DIAGNOSIS — S82.409D: ICD-10-CM

## 2025-04-16 DIAGNOSIS — S82.452A CLOSED DISPLACED COMMINUTED FRACTURE OF SHAFT OF LEFT FIBULA, INITIAL ENCOUNTER: ICD-10-CM

## 2025-04-16 DIAGNOSIS — M89.8X6 PAIN OF LEFT FIBULA: ICD-10-CM

## 2025-04-16 PROCEDURE — 73590 X-RAY EXAM OF LOWER LEG: CPT | Mod: LT

## 2025-04-16 PROCEDURE — 1036F TOBACCO NON-USER: CPT | Performed by: SPECIALIST

## 2025-04-16 PROCEDURE — 99214 OFFICE O/P EST MOD 30 MIN: CPT | Performed by: SPECIALIST

## 2025-04-16 RX ORDER — ASPIRIN 81 MG/1
81 TABLET ORAL DAILY
COMMUNITY

## 2025-04-16 RX ORDER — ROSUVASTATIN CALCIUM 5 MG/1
5 TABLET, COATED ORAL DAILY
COMMUNITY

## 2025-04-16 ASSESSMENT — PAIN SCALES - GENERAL: PAINLEVEL_OUTOF10: 2

## 2025-04-16 ASSESSMENT — PAIN - FUNCTIONAL ASSESSMENT: PAIN_FUNCTIONAL_ASSESSMENT: 0-10

## 2025-04-16 ASSESSMENT — PAIN DESCRIPTION - DESCRIPTORS: DESCRIPTORS: SORE
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
## 2025-04-16 NOTE — ASSESSMENT & PLAN NOTE
Assessment: Left midshaft minimally displaced distal fibula fracture.  Now almost a month status post injury.      Plan:  Weightbearing as tolerated.  I encouraged range of motion of the knee and ankle.  Continue the 81 mg aspirin a day  Follow-up in 3- 4 weeks for reevaluation with new x-rays.

## 2025-05-08 ENCOUNTER — HOSPITAL ENCOUNTER (OUTPATIENT)
Dept: RADIOLOGY | Facility: CLINIC | Age: 55
Discharge: HOME | End: 2025-05-08
Payer: COMMERCIAL

## 2025-05-08 ENCOUNTER — APPOINTMENT (OUTPATIENT)
Dept: ORTHOPEDIC SURGERY | Facility: CLINIC | Age: 55
End: 2025-05-08
Payer: COMMERCIAL

## 2025-05-08 ENCOUNTER — HOSPITAL ENCOUNTER (OUTPATIENT)
Dept: RADIOLOGY | Facility: EXTERNAL LOCATION | Age: 55
Discharge: HOME | End: 2025-05-08

## 2025-05-08 VITALS — WEIGHT: 248.4 LBS | BODY MASS INDEX: 35.64 KG/M2

## 2025-05-08 DIAGNOSIS — S82.452A CLOSED DISPLACED COMMINUTED FRACTURE OF SHAFT OF LEFT FIBULA, INITIAL ENCOUNTER: ICD-10-CM

## 2025-05-08 DIAGNOSIS — S82.409D: ICD-10-CM

## 2025-05-08 PROCEDURE — 1036F TOBACCO NON-USER: CPT | Performed by: SPECIALIST

## 2025-05-08 PROCEDURE — 76882 US LMTD JT/FCL EVL NVASC XTR: CPT | Performed by: SPECIALIST

## 2025-05-08 PROCEDURE — 99214 OFFICE O/P EST MOD 30 MIN: CPT | Performed by: SPECIALIST

## 2025-05-08 PROCEDURE — 73590 X-RAY EXAM OF LOWER LEG: CPT | Mod: LT

## 2025-05-08 ASSESSMENT — PAIN - FUNCTIONAL ASSESSMENT: PAIN_FUNCTIONAL_ASSESSMENT: 0-10

## 2025-05-08 ASSESSMENT — PAIN SCALES - GENERAL: PAINLEVEL_OUTOF10: 3
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
## 2025-05-09 DIAGNOSIS — S82.452A CLOSED DISPLACED COMMINUTED FRACTURE OF SHAFT OF LEFT FIBULA, INITIAL ENCOUNTER: ICD-10-CM

## 2025-06-28 DIAGNOSIS — C66.2 URETERAL CANCER, LEFT (MULTI): ICD-10-CM

## 2025-06-29 ENCOUNTER — APPOINTMENT (OUTPATIENT)
Dept: RADIOLOGY | Facility: HOSPITAL | Age: 55
End: 2025-06-29
Payer: COMMERCIAL

## 2025-07-03 ENCOUNTER — APPOINTMENT (OUTPATIENT)
Dept: ORTHOPEDIC SURGERY | Facility: CLINIC | Age: 55
End: 2025-07-03
Payer: COMMERCIAL

## 2025-07-13 LAB
ANION GAP SERPL CALCULATED.4IONS-SCNC: 8 MMOL/L (CALC) (ref 7–17)
BUN SERPL-MCNC: 20 MG/DL (ref 7–25)
BUN/CREAT SERPL: 30 (CALC) (ref 6–22)
CALCIUM SERPL-MCNC: 8.6 MG/DL (ref 8.6–10.3)
CHLORIDE SERPL-SCNC: 106 MMOL/L (ref 98–110)
CO2 SERPL-SCNC: 21 MMOL/L (ref 20–32)
CREAT SERPL-MCNC: 0.66 MG/DL (ref 0.7–1.3)
EGFRCR SERPLBLD CKD-EPI 2021: 109 ML/MIN/1.73M2
GLUCOSE SERPL-MCNC: 193 MG/DL (ref 65–99)
POTASSIUM SERPL-SCNC: 4.5 MMOL/L (ref 3.5–5.3)
SODIUM SERPL-SCNC: 135 MMOL/L (ref 135–146)

## 2025-07-14 ENCOUNTER — APPOINTMENT (OUTPATIENT)
Dept: RADIOLOGY | Facility: HOSPITAL | Age: 55
End: 2025-07-14
Payer: COMMERCIAL

## 2025-07-14 DIAGNOSIS — C66.2 URETERAL CANCER, LEFT (MULTI): ICD-10-CM

## 2025-07-14 PROCEDURE — 76377 3D RENDER W/INTRP POSTPROCES: CPT

## 2025-07-14 PROCEDURE — 76377 3D RENDER W/INTRP POSTPROCES: CPT | Performed by: RADIOLOGY

## 2025-07-14 PROCEDURE — 74178 CT ABD&PLV WO CNTR FLWD CNTR: CPT | Performed by: RADIOLOGY

## 2025-07-14 PROCEDURE — 2550000001 HC RX 255 CONTRASTS: Performed by: STUDENT IN AN ORGANIZED HEALTH CARE EDUCATION/TRAINING PROGRAM

## 2025-07-14 RX ADMIN — IOHEXOL 90 ML: 350 INJECTION, SOLUTION INTRAVENOUS at 15:03

## 2025-07-18 ENCOUNTER — APPOINTMENT (OUTPATIENT)
Age: 55
End: 2025-07-18
Payer: COMMERCIAL

## 2025-07-18 VITALS — DIASTOLIC BLOOD PRESSURE: 86 MMHG | SYSTOLIC BLOOD PRESSURE: 132 MMHG | TEMPERATURE: 98 F | HEART RATE: 60 BPM

## 2025-07-18 DIAGNOSIS — C66.2 URETERAL CANCER, LEFT (MULTI): ICD-10-CM

## 2025-07-18 DIAGNOSIS — C67.9 MALIGNANT NEOPLASM OF URINARY BLADDER, UNSPECIFIED SITE (MULTI): ICD-10-CM

## 2025-07-18 LAB
POC APPEARANCE, URINE: CLEAR
POC BILIRUBIN, URINE: NEGATIVE
POC BLOOD, URINE: ABNORMAL
POC COLOR, URINE: YELLOW
POC GLUCOSE, URINE: NEGATIVE MG/DL
POC KETONES, URINE: NEGATIVE MG/DL
POC LEUKOCYTES, URINE: NEGATIVE
POC NITRITE,URINE: NEGATIVE
POC PH, URINE: 7 PH
POC PROTEIN, URINE: NEGATIVE MG/DL
POC SPECIFIC GRAVITY, URINE: 1.01
POC UROBILINOGEN, URINE: 1 EU/DL

## 2025-07-18 PROCEDURE — 52000 CYSTOURETHROSCOPY: CPT | Performed by: STUDENT IN AN ORGANIZED HEALTH CARE EDUCATION/TRAINING PROGRAM

## 2025-07-18 PROCEDURE — 99213 OFFICE O/P EST LOW 20 MIN: CPT | Performed by: STUDENT IN AN ORGANIZED HEALTH CARE EDUCATION/TRAINING PROGRAM

## 2025-07-18 PROCEDURE — 88112 CYTOPATH CELL ENHANCE TECH: CPT | Performed by: PATHOLOGY

## 2025-07-18 PROCEDURE — 88112 CYTOPATH CELL ENHANCE TECH: CPT

## 2025-07-18 PROCEDURE — 81003 URINALYSIS AUTO W/O SCOPE: CPT | Performed by: STUDENT IN AN ORGANIZED HEALTH CARE EDUCATION/TRAINING PROGRAM

## 2025-07-18 NOTE — ASSESSMENT & PLAN NOTE
I reviewed her imaging and we discussed this in detail. CT urogram done 7/15/2025 was negative.     Cystoscopy showed no evidence of recurrence. Cytology today. If negative we will continue with one more surveillance cystoscopy in 3 months then move to 6 months.     PLAN:  Cystoscopy in 3 months with cytology    Orders:    Cystoscopy; Future    POCT UA Automated manually resulted    Cytology Consultation (Non-Gynecologic)

## 2025-07-18 NOTE — ASSESSMENT & PLAN NOTE
Orders:    Cystoscopy; Future    POCT UA Automated manually resulted    Cytology Consultation (Non-Gynecologic)

## 2025-07-18 NOTE — PROGRESS NOTES
Subjective    Cornell Larios is a 55 y.o. male with a history of NMIBC who presents for 3 month surveillance cystoscopy.      He reports feeling well. Denies hematuria.     CT urogram done 7/15/2025 was negative.      S/p cystoscopy, left ureteroscopy, left retrograde pyelogram on 02/25/25 Pathology showed sections show urothelial mucosa with prominent atypia that does not meet the diagnostic threshold for carcinoma in situ. Cytology was negative.     He has a history of NMIBC.  He has failed BCG in the past per reports.       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 Right distal ureter abnormality on imaging.  URS negative  Imaging date: 2/2024  Last cysto: 7/2024, negative       Review of Systems    All systems were reviewed. Anything negative was noted in the HPI.    Objective   Physical Exam  Gen: No acute distress      Psych: Alert and oriented x3      Neuro:  Normal ROM     Resp: Nonlabored respirations      CV: Regular rate and rhythm      Abd: S, NT, ND.     : Deferred     Skin: Warm, dry and intact without rashes      Lymphatics: No peripheral edema       Patient ID: Cornell Larios is a 55 y.o. male.    Cystoscopy    Date/Time: 7/18/2025 2:14 PM    Performed by: Agapito Coyne MD MPH  Authorized by: Agapito Coyne MD MPH    Procedure - Bladder Cystoscopy:     Procedure details: cystoscopy    Post-procedure:     Patient tolerance: Patient tolerated the procedure well with no immediate complications      Comments:      No evidence of recurrence        Assessment & Plan  Ureteral cancer, left (Multi)  I reviewed her  imaging and we discussed this in detail. CT urogram done 7/15/2025 was negative.     Cystoscopy showed no evidence of recurrence. Cytology today. If negative we will continue with one more surveillance cystoscopy in 3 months then move to 6 months.     PLAN:  Cystoscopy in 3 months with cytology    Orders:    Cystoscopy; Future    POCT UA Automated manually resulted    Cytology Consultation (Non-Gynecologic)    Malignant neoplasm of urinary bladder, unspecified site (Multi)    Orders:    Cystoscopy; Future    POCT UA Automated manually resulted    Cytology Consultation (Non-Gynecologic)                               Scribe Attestation  By signing my name below, IHolli, Scryuliet   attest that this documentation has been prepared under the direction and in the presence of Agapito Coyne MD MPH

## 2025-10-31 ENCOUNTER — APPOINTMENT (OUTPATIENT)
Age: 55
End: 2025-10-31
Payer: COMMERCIAL

## (undated) DEVICE — GUIDEWIRE, STRAIGHT, AMPLATZ SUPER STIFF, 0.035 IN X 145 CM

## (undated) DEVICE — COVER, CART, 45 X 27 X 48 IN, CLEAR

## (undated) DEVICE — CATHETER, URETERAL 10FR DUAL LUMEN

## (undated) DEVICE — Device

## (undated) DEVICE — BAG, DRAINAGE, URINARY, W/ANTI-REFLUX CHAMBER, INFECTION CON

## (undated) DEVICE — TUBING, SUCTION, CONNECTING, STERILE 0.25 X 120 IN., LF

## (undated) DEVICE — COLLECTION BAG, FLUID, NON-STERILE

## (undated) DEVICE — COLLECTION BAG, FLUID, F/STERIS LOOP, STERILE

## (undated) DEVICE — STATLOCK, FOLEY SWIVEL, SILICONE TRICOT

## (undated) DEVICE — TOWEL, SURGICAL, NEURO, O/R, 16 X 26, BLUE, STERILE

## (undated) DEVICE — SHEATH, URETERAL ACCESS, NAVIGATOR 11/13FR X 46CM

## (undated) DEVICE — CATHETER, DUAL LUMEN, UDC/10/50 M

## (undated) DEVICE — GUIDEWIRE, DUAL SENSOR, .035 X 150 STRAIGHT,  3CM

## (undated) DEVICE — SCOPE, LITHOVUE SUD ONLY, GLOBAL STANDARD

## (undated) DEVICE — MANIFOLD, 4 PORT NEPTUNE STANDARD

## (undated) DEVICE — GUIDEWIRE, ULTRA TRACK, HYBRID, 0.035 IN X 150CM